# Patient Record
Sex: FEMALE | Race: WHITE | Employment: FULL TIME | URBAN - NONMETROPOLITAN AREA
[De-identification: names, ages, dates, MRNs, and addresses within clinical notes are randomized per-mention and may not be internally consistent; named-entity substitution may affect disease eponyms.]

---

## 2017-05-08 ENCOUNTER — OFFICE VISIT (OUTPATIENT)
Dept: FAMILY MEDICINE CLINIC | Age: 22
End: 2017-05-08

## 2017-05-08 VITALS
BODY MASS INDEX: 22.76 KG/M2 | WEIGHT: 141.6 LBS | HEART RATE: 72 BPM | HEIGHT: 66 IN | DIASTOLIC BLOOD PRESSURE: 60 MMHG | SYSTOLIC BLOOD PRESSURE: 134 MMHG | TEMPERATURE: 98.6 F

## 2017-05-08 DIAGNOSIS — J01.00 SUBACUTE MAXILLARY SINUSITIS: Primary | ICD-10-CM

## 2017-05-08 PROCEDURE — 99211 OFF/OP EST MAY X REQ PHY/QHP: CPT | Performed by: FAMILY MEDICINE

## 2017-05-08 PROCEDURE — G8420 CALC BMI NORM PARAMETERS: HCPCS | Performed by: FAMILY MEDICINE

## 2017-05-08 PROCEDURE — G8427 DOCREV CUR MEDS BY ELIG CLIN: HCPCS | Performed by: FAMILY MEDICINE

## 2017-05-08 RX ORDER — DOXYCYCLINE HYCLATE 100 MG/1
100 CAPSULE ORAL 2 TIMES DAILY
Qty: 20 CAPSULE | Refills: 0 | Status: SHIPPED | OUTPATIENT
Start: 2017-05-08 | End: 2017-05-18

## 2017-11-03 ENCOUNTER — NURSE ONLY (OUTPATIENT)
Dept: FAMILY MEDICINE CLINIC | Age: 22
End: 2017-11-03
Payer: COMMERCIAL

## 2017-11-03 DIAGNOSIS — Z23 NEED FOR IMMUNIZATION AGAINST INFLUENZA: Primary | ICD-10-CM

## 2017-11-03 PROCEDURE — 90471 IMMUNIZATION ADMIN: CPT | Performed by: FAMILY MEDICINE

## 2017-11-03 PROCEDURE — 90688 IIV4 VACCINE SPLT 0.5 ML IM: CPT | Performed by: FAMILY MEDICINE

## 2018-08-07 ENCOUNTER — NURSE ONLY (OUTPATIENT)
Dept: FAMILY MEDICINE CLINIC | Age: 23
End: 2018-08-07
Payer: COMMERCIAL

## 2018-08-07 DIAGNOSIS — Z11.1 SCREENING FOR TUBERCULOSIS: Primary | ICD-10-CM

## 2018-08-07 PROCEDURE — 86580 TB INTRADERMAL TEST: CPT | Performed by: FAMILY MEDICINE

## 2018-08-07 NOTE — PROGRESS NOTES
Patient presented to office for Mantoux Test. Given in Right Forearm. Patient tolerated procedure well. Advised to come back into office on Thursday to be read. Verbalized understanding.

## 2018-08-10 LAB
INDURATION: 0
TB SKIN TEST: NEGATIVE

## 2019-02-21 ENCOUNTER — E-VISIT (OUTPATIENT)
Dept: FAMILY MEDICINE CLINIC | Age: 24
End: 2019-02-21
Payer: COMMERCIAL

## 2019-02-21 DIAGNOSIS — J01.90 ACUTE SINUSITIS, RECURRENCE NOT SPECIFIED, UNSPECIFIED LOCATION: Primary | ICD-10-CM

## 2019-02-21 PROCEDURE — 99444 PR PHYSICIAN ONLINE EVALUATION & MANAGEMENT SERVICE: CPT | Performed by: FAMILY MEDICINE

## 2019-02-21 RX ORDER — PREDNISONE 10 MG/1
10 TABLET ORAL 2 TIMES DAILY
Qty: 14 TABLET | Refills: 0 | Status: SHIPPED | OUTPATIENT
Start: 2019-02-21 | End: 2019-02-28

## 2019-02-21 RX ORDER — AMOXICILLIN 500 MG/1
500 CAPSULE ORAL 2 TIMES DAILY
Qty: 20 CAPSULE | Refills: 0 | Status: SHIPPED | OUTPATIENT
Start: 2019-02-21 | End: 2019-04-14 | Stop reason: SDUPTHER

## 2019-02-21 ASSESSMENT — LIFESTYLE VARIABLES: SMOKING_STATUS: NO, I'VE NEVER SMOKED

## 2019-04-14 ENCOUNTER — TELEPHONE (OUTPATIENT)
Dept: FAMILY MEDICINE CLINIC | Age: 24
End: 2019-04-14

## 2019-04-14 DIAGNOSIS — J02.0 STREP THROAT: Primary | ICD-10-CM

## 2019-04-14 RX ORDER — AMOXICILLIN 500 MG/1
500 CAPSULE ORAL 2 TIMES DAILY
Qty: 20 CAPSULE | Refills: 0 | Status: SHIPPED | OUTPATIENT
Start: 2019-04-14 | End: 2019-04-24

## 2019-04-14 NOTE — TELEPHONE ENCOUNTER
Patient's mother phoned in stating that Irlanda Kilgore is come from school and has tonsillar exudates, fever to 100.5, and no cough. Rx for Amoxicillin was sent in.

## 2019-04-18 ENCOUNTER — OFFICE VISIT (OUTPATIENT)
Dept: FAMILY MEDICINE CLINIC | Age: 24
End: 2019-04-18
Payer: COMMERCIAL

## 2019-04-18 VITALS
HEART RATE: 80 BPM | WEIGHT: 148.8 LBS | TEMPERATURE: 98.5 F | SYSTOLIC BLOOD PRESSURE: 118 MMHG | BODY MASS INDEX: 23.91 KG/M2 | HEIGHT: 66 IN | DIASTOLIC BLOOD PRESSURE: 62 MMHG

## 2019-04-18 DIAGNOSIS — J02.9 ACUTE VIRAL PHARYNGITIS: Primary | ICD-10-CM

## 2019-04-18 PROCEDURE — 99213 OFFICE O/P EST LOW 20 MIN: CPT | Performed by: FAMILY MEDICINE

## 2019-04-18 ASSESSMENT — PATIENT HEALTH QUESTIONNAIRE - PHQ9
1. LITTLE INTEREST OR PLEASURE IN DOING THINGS: 0
SUM OF ALL RESPONSES TO PHQ QUESTIONS 1-9: 0
SUM OF ALL RESPONSES TO PHQ QUESTIONS 1-9: 0
2. FEELING DOWN, DEPRESSED OR HOPELESS: 0
SUM OF ALL RESPONSES TO PHQ9 QUESTIONS 1 & 2: 0

## 2019-04-19 NOTE — PROGRESS NOTES
Name:  Danielle Stephens  :    1995    Chief Complaint   Patient presents with    Pharyngitis       HPI:  Neeru Renee has a history of large tonsils as a child. No history of OM or MAT. She does not snore. She reports 3---monthly ---episodes of severe sore throat with fever and large tonsils and nodes. Twice she got treated. Physical Exam:      /62 (Site: Left Upper Arm, Position: Sitting, Cuff Size: Medium Adult)   Pulse 80   Temp 98.5 °F (36.9 °C) (Oral)   Ht 5' 6\" (1.676 m)   Wt 148 lb 12.8 oz (67.5 kg)   LMP 2019 (Exact Date)   Breastfeeding? No   BMI 24.02 kg/m²     ENT:   TM's clear. Phar is red with normal tonsils. No nodes. Lungs are clear. Heart in RRR with no murmur. Assessment/Plan:      Recurrent pharyngitis and tonsillitis. Treat as necessary and consider ENT      There are no diagnoses linked to this encounter.

## 2020-07-07 ENCOUNTER — OFFICE VISIT (OUTPATIENT)
Dept: FAMILY MEDICINE CLINIC | Age: 25
End: 2020-07-07
Payer: COMMERCIAL

## 2020-07-07 VITALS
WEIGHT: 158 LBS | HEART RATE: 74 BPM | SYSTOLIC BLOOD PRESSURE: 122 MMHG | BODY MASS INDEX: 25.39 KG/M2 | DIASTOLIC BLOOD PRESSURE: 78 MMHG | OXYGEN SATURATION: 98 % | HEIGHT: 66 IN | TEMPERATURE: 98.4 F

## 2020-07-07 PROBLEM — N92.6 MENSTRUAL IRREGULARITY: Status: ACTIVE | Noted: 2020-07-07

## 2020-07-07 PROCEDURE — 99204 OFFICE O/P NEW MOD 45 MIN: CPT | Performed by: FAMILY MEDICINE

## 2020-07-07 SDOH — HEALTH STABILITY: MENTAL HEALTH: HOW MANY STANDARD DRINKS CONTAINING ALCOHOL DO YOU HAVE ON A TYPICAL DAY?: 1 OR 2

## 2020-07-07 SDOH — HEALTH STABILITY: MENTAL HEALTH: HOW OFTEN DO YOU HAVE A DRINK CONTAINING ALCOHOL?: 2-4 TIMES A MONTH

## 2020-07-07 ASSESSMENT — PATIENT HEALTH QUESTIONNAIRE - PHQ9
SUM OF ALL RESPONSES TO PHQ QUESTIONS 1-9: 0
SUM OF ALL RESPONSES TO PHQ QUESTIONS 1-9: 0
2. FEELING DOWN, DEPRESSED OR HOPELESS: 0
SUM OF ALL RESPONSES TO PHQ9 QUESTIONS 1 & 2: 0
1. LITTLE INTEREST OR PLEASURE IN DOING THINGS: 0

## 2020-07-07 ASSESSMENT — ENCOUNTER SYMPTOMS
RESPIRATORY NEGATIVE: 1
EYES NEGATIVE: 1
GASTROINTESTINAL NEGATIVE: 1
ALLERGIC/IMMUNOLOGIC NEGATIVE: 1

## 2020-07-07 NOTE — PROGRESS NOTES
Subjective:      Patient ID: Pedrito Rebollar is a 21 y.o. female. Blood pressure 122/78, pulse 74, temperature 98.4 °F (36.9 °C), temperature source Temporal, height 5' 6\" (1.676 m), weight 158 lb (71.7 kg), last menstrual period 07/06/2020, SpO2 98 %, not currently breastfeeding. HPI   Chief Complaint   Patient presents with    New Patient     get established for [facility]/fertility only (has PCP)      Here for concerns of infertility. Marred to María Chi (age 21, no pmh, no meds, no prior SFA) 8/19. Seeking pregnancy since 1/20. 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 Says after her 4/20 cycle, her teacher recommended eval for low progesterone based on chart abnormalities. Regular cycles, ranging 26-36 days. Luteal phase 10-17 days. Occasional TEBB  Decent mucus (average score 6)    Symptoms: improving superficial dyspareunia (at introitus), no deep dyspareunia. She is a runner and notes pelvic cramping after long runs in the luteal phase. Resolves after 10-20 min. Menses are uncomfortable to take Ibuprofen on the first day of menses. Improving with age.  + PMS for 3-4 days (fatigue, cramping, headaches, irritable, soto, bloating, occ breast tenderness, food cravings). She was seeing extraneous mucus and took lactobacillus supplement. She has never been pregnant.  has never fathered children before. Pap normal 1/20 by local OB.    + acne that has gradually worsened. + hair on upper lip, chin. Plucks hair from chin/chest.  Cycle length has increased in past 3-4 months, up to 36 days, ovulating as late as cycle day 27. Review of Systems   Constitutional: Negative. HENT: Negative. Eyes: Negative. Respiratory: Negative. Cardiovascular: Negative. Gastrointestinal: Negative. Endocrine: Negative. Genitourinary: Negative. Musculoskeletal: Negative. Skin: Negative.         + acne on face chest and back.   Has worsened gradually in recent years. Allergic/Immunologic: Negative. Neurological: Negative. Hematological: Negative. Psychiatric/Behavioral: Negative. Objective:   Physical Exam  Vitals signs and nursing note reviewed. Constitutional:       General: She is not in acute distress. Appearance: Normal appearance. She is well-developed and normal weight. She is not diaphoretic. HENT:      Head: Normocephalic and atraumatic. Right Ear: Tympanic membrane, ear canal and external ear normal.      Left Ear: Tympanic membrane, ear canal and external ear normal.      Nose: Nose normal. No congestion or rhinorrhea. Mouth/Throat:      Mouth: Mucous membranes are moist.      Pharynx: Oropharynx is clear. No oropharyngeal exudate. Eyes:      General: No scleral icterus. Right eye: No discharge. Left eye: No discharge. Conjunctiva/sclera: Conjunctivae normal.      Pupils: Pupils are equal, round, and reactive to light. Neck:      Musculoskeletal: Normal range of motion and neck supple. Thyroid: No thyromegaly. Vascular: No JVD. Trachea: No tracheal deviation. Comments: No thyromegaly  Cardiovascular:      Rate and Rhythm: Normal rate and regular rhythm. Pulses: Normal pulses. Heart sounds: Normal heart sounds. No murmur. No friction rub. Pulmonary:      Effort: Pulmonary effort is normal. No respiratory distress. Breath sounds: Normal breath sounds. No stridor. No wheezing, rhonchi or rales. Abdominal:      General: Bowel sounds are normal. There is no distension. Palpations: Abdomen is soft. There is no mass. Tenderness: There is no abdominal tenderness. There is no guarding or rebound. Hernia: No hernia is present. Musculoskeletal: Normal range of motion. General: No swelling, tenderness or deformity. Right lower leg: No edema. Left lower leg: No edema. Lymphadenopathy:      Cervical: No cervical adenopathy.    Skin: General: Skin is warm and dry. Findings: Rash (+ mild facial inflammatory comedos, also chest/back. mild hirsute upper lip.) present. Neurological:      General: No focal deficit present. Mental Status: She is alert and oriented to person, place, and time. Mental status is at baseline. Deep Tendon Reflexes: Reflexes are normal and symmetric. Psychiatric:         Mood and Affect: Mood normal.         Behavior: Behavior normal.         Thought Content: Thought content normal.         Judgment: Judgment normal.         Assessment:      1. Menstrual irregularity    discussed a couple abnormalities of her menstrual pattern. 1. Unstable (high variability) luteal phase with PMS and tail end brown spotting often indicated luteal dysfunction. discussed rationale for investigating this with a luteal estradiol and progesterone profile. She will do this local to her this cycle (she is cycle day 2 today) and we will discuss the results virtually after. Also advised thyroid and prolactin testing.  - Estradiol; Standing  - Progesterone; Standing  - TSH without Reflex; Future  - Prolactin; Future    2. Almost meets criteria for PCOS: hirsutism/acne with lengthening cycles as she has gained weight.  - discussed pathophysiology of PCOS, 2003 [place] criteria, the role of hyperinsulinemia as well as treatments to reduce insulin resistance to help normalize cycle length and perhaps improve ovulatory function. She wishes to defer medical therapy at this time to pursue lifestyle changes. Continue to monitor. No indication for infertility eval/workup at this time. Plan:        I spent a total of 45 minutes face to face with the patient and greater than 50% of the time was spent in discussing disease pathophysiology, motivational counseling, answering questions, addressing concerns, reviewing labs and/or other studies with the patient as well as coordinating care.             Scott Avelar, MD

## 2020-08-12 ENCOUNTER — TELEMEDICINE (OUTPATIENT)
Dept: FAMILY MEDICINE CLINIC | Age: 25
End: 2020-08-12
Payer: COMMERCIAL

## 2020-08-12 PROBLEM — N91.4 SECONDARY OLIGOMENORRHEA: Status: ACTIVE | Noted: 2020-08-12

## 2020-08-12 PROBLEM — E28.8 OTHER OVARIAN DYSFUNCTION: Status: ACTIVE | Noted: 2020-08-12

## 2020-08-12 PROCEDURE — 99215 OFFICE O/P EST HI 40 MIN: CPT | Performed by: FAMILY MEDICINE

## 2020-08-12 RX ORDER — ESTRADIOL 1 MG/1
1 TABLET ORAL DAILY
Qty: 30 TABLET | Refills: 1 | Status: SHIPPED | OUTPATIENT
Start: 2020-08-12 | End: 2021-02-09 | Stop reason: SDUPTHER

## 2020-11-05 ENCOUNTER — TELEPHONE (OUTPATIENT)
Dept: FAMILY MEDICINE CLINIC | Age: 25
End: 2020-11-05

## 2020-11-05 NOTE — TELEPHONE ENCOUNTER
lvm for pt  She may opt to use GoodRx card  would be $19 at [employer] to check with her before sending to mail order

## 2020-11-06 NOTE — TELEPHONE ENCOUNTER
Pt is going to try the goodrx with [facility]  Please send to [facility] [address] in [place]    Please advise

## 2020-12-01 ENCOUNTER — OFFICE VISIT (OUTPATIENT)
Dept: FAMILY MEDICINE CLINIC | Age: 25
End: 2020-12-01
Payer: COMMERCIAL

## 2020-12-01 VITALS
BODY MASS INDEX: 23.14 KG/M2 | SYSTOLIC BLOOD PRESSURE: 120 MMHG | TEMPERATURE: 97.7 F | OXYGEN SATURATION: 98 % | DIASTOLIC BLOOD PRESSURE: 76 MMHG | WEIGHT: 144 LBS | HEIGHT: 66 IN | HEART RATE: 82 BPM

## 2020-12-01 PROCEDURE — 99214 OFFICE O/P EST MOD 30 MIN: CPT | Performed by: FAMILY MEDICINE

## 2020-12-01 RX ORDER — METFORMIN HYDROCHLORIDE 500 MG/1
1500 TABLET, EXTENDED RELEASE ORAL
Qty: 90 TABLET | Refills: 5 | Status: SHIPPED | OUTPATIENT
Start: 2020-12-01 | End: 2021-08-10

## 2020-12-01 RX ORDER — DOXYCYCLINE HYCLATE 100 MG
100 TABLET ORAL 2 TIMES DAILY
Qty: 28 TABLET | Refills: 0 | Status: SHIPPED | OUTPATIENT
Start: 2020-12-01 | End: 2020-12-15

## 2020-12-01 NOTE — PROGRESS NOTES
Subjective:      Patient ID: Deion Aguayo is a 31 y.o. female. Blood pressure 120/76, pulse 82, temperature 97.7 °F (36.5 °C), temperature source Temporal, height 5' 6\" (1.676 m), weight 144 lb (65.3 kg), last menstrual period 11/06/2020, SpO2 98 %, not currently breastfeeding. HPI    Chief Complaint   Patient presents with    Follow-up     fertility/napro f/u      F/u on menstrual irregularity, variable luteal phase 10-17 days, PMS, pelvic pain/cramping (nicki after running, dysmenorrhea), with delayed conception (fertility focused intercourse since 1/20). G0.    Hx long cycles up to 36 days and acne. Not on insulin sensitizing agents yet. Is using clomid cycle day 3.4.5 since Sept (3 cycles.)   Completed luteal profile in July- showed ovarian dysfunction. Current luteal treatment: luteal estrace 1mg and Prometrium 200mg peak plus 3-12     Her recent peak plus 7 levels were ideal: progesterone 20.0 and E2 246. Chart review: cycle length 29-32 days in past 3 cycles, good mucus (5 days of peak type mucus) even on clomid. 1 day of TEBB. Has 'wet' discharge that is pretty constant. Notes that this is sometimes associated with chronic cervicitis. Mild dyspareunia, but is superficial and is improving. Mild cramping with menses  PMS: mild cramping 3 days prior to menses, more emotional. This has not really changed since starting the luteal estrace/progesterone treatments. No prior HSG  No prior SFA done. No prior TVUS    She has lost weight: she has reduced sugars and high glycemic foods. Starting 7/20. Prior to July, her cycles were 36 days or more often. She was also more stressed then. Patient Active Problem List   Diagnosis    Menstrual irregularity    Other ovarian dysfunction (luteal defect, 7/2020)    Secondary oligomenorrhea (sporadic long cycles with acne/hirsutism, likely PCOS). Body mass index is 23.24 kg/m².     Wt Readings from Last 3 Encounters:   12/01/20 144 lb (65.3 kg)   20 158 lb (71.7 kg)   19 148 lb 12.8 oz (67.5 kg)      BP Readings from Last 3 Encounters:   20 120/76   20 122/78   19 118/62      Current Outpatient Medications   Medication Sig Dispense Refill    clomiPHENE (CLOMID) 50 MG tablet Take 2 tablets by mouth daily for 3 days (days 3,4 and 5 each cycle) 18 tablet 1    progesterone (PROMETRIUM) 200 MG capsule 1 cap intravaginally each night as directed (peak plus 3-12) 30 capsule 1    estradiol (ESTRACE) 1 MG tablet Take 1 tablet by mouth daily (peak plus 3-12) 30 tablet 1    Prenatal Multivit-Min-Fe-FA (PRE-TA FORMULA) TABS Take by mouth daily       No current facility-administered medications for this visit. Immunization History   Administered Date(s) Administered    DTaP 01/10/1995, 1995, 1995, 1996, 1999    Hepatitis B (Engerix-B) 1999, 1999, 1999    Hib PRP-OMP (PedvaxHIB) 01/10/1995, 1995, 1995, 1996    Influenza, Norva Erasmo, IM, (6 mo and older Fluzone, Flulaval, Fluarix and 3 yrs and older Afluria) 2017    MMR 1996, 1999    PPD Test 2018    Polio IPV (IPOL) 01/10/1995, 1995, 1995, 1999    Tdap (Boostrix, Adacel) 2008        Social History     Tobacco Use    Smoking status: Never Smoker    Smokeless tobacco: Never Used   Substance Use Topics    Alcohol use: Yes     Alcohol/week: 1.0 standard drinks     Types: 1 Glasses of wine per week     Frequency: 2-4 times a month     Drinks per session: 1 or 2     Binge frequency: Less than monthly     Comment: ocassional make have liquor    Drug use: Never        Review of Systems no new concerns. Objective:   Physical Exam  Vitals signs and nursing note reviewed. Constitutional:       General: She is not in acute distress. Appearance: Normal appearance. She is not ill-appearing.    Pulmonary:      Effort: Pulmonary effort is normal.   Skin:     General: Skin is warm. Neurological:      General: No focal deficit present. Mental Status: She is alert and oriented to person, place, and time. Mental status is at baseline. Psychiatric:         Mood and Affect: Mood normal.         Behavior: Behavior normal.         Thought Content: Thought content normal.         Judgment: Judgment normal.         Assessment:      1. Other ovarian dysfunction (luteal defect, 7/2020)  - continue luteal support with progesterone and estradiol supplementation and continue to monitor response. - continue ovarian stim in hopes of pregnancy. - se discussed that her mucus currently is adequate for conception and is 'tolerating' the antiestrogen effect of clomid. 2. Secondary oligomenorrhea (sporadic long cycles with acne/hirsutism, likely PCOS). - assess for other evidence of PCO with TVUS. - US NON OB TRANSVAGINAL; Future  - start insulin sensitizing agent metformin with her approval, off label for likely pcos. - continue healthy lifestyle changes as well- likely is helping. 3. Vaginal discharge  - chronic wet d/c. In lieu of testing for mycoplasma/ureaplasma, will treat presumptively with doxy bid for 2 wks. She will report her response. Plan:      F/u 3 mo, virtually. I spent a total of 25 minutes face to face with the patient and greater than 50% of the time was spent in discussing disease pathophysiology, motivational counseling, answering questions, addressing concerns, reviewing labs and/or other studies with the patient as well as coordinating care.             Breanna Zamora MD

## 2020-12-21 ENCOUNTER — HOSPITAL ENCOUNTER (OUTPATIENT)
Dept: ULTRASOUND IMAGING | Age: 25
Discharge: HOME | End: 2020-12-21
Payer: COMMERCIAL

## 2020-12-21 PROCEDURE — 93975 VASCULAR STUDY: CPT

## 2020-12-21 PROCEDURE — 76830 TRANSVAGINAL US NON-OB: CPT

## 2021-02-09 RX ORDER — ESTRADIOL 1 MG/1
1 TABLET ORAL DAILY
Qty: 30 TABLET | Refills: 1 | Status: SHIPPED | OUTPATIENT
Start: 2021-02-09 | End: 2021-04-12

## 2021-02-15 ENCOUNTER — HOSPITAL ENCOUNTER (OUTPATIENT)
Dept: ULTRASOUND IMAGING | Age: 26
Discharge: HOME | End: 2021-02-15
Payer: COMMERCIAL

## 2021-02-15 DIAGNOSIS — N83.292 COMPLEX CYST OF LEFT OVARY: ICD-10-CM

## 2021-02-15 PROCEDURE — 93975 VASCULAR STUDY: CPT

## 2021-02-15 PROCEDURE — 76830 TRANSVAGINAL US NON-OB: CPT

## 2021-03-03 ENCOUNTER — TELEMEDICINE (OUTPATIENT)
Dept: FAMILY MEDICINE CLINIC | Age: 26
End: 2021-03-03
Payer: COMMERCIAL

## 2021-03-03 DIAGNOSIS — N88.3 FEMALE INFERTILITY ASSOCIATED WITH ANOMALY OF CERVICAL MUCUS: ICD-10-CM

## 2021-03-03 DIAGNOSIS — E28.8 OTHER OVARIAN DYSFUNCTION: Primary | ICD-10-CM

## 2021-03-03 DIAGNOSIS — N91.4 SECONDARY OLIGOMENORRHEA: ICD-10-CM

## 2021-03-03 PROCEDURE — 99214 OFFICE O/P EST MOD 30 MIN: CPT | Performed by: FAMILY MEDICINE

## 2021-03-03 ASSESSMENT — PATIENT HEALTH QUESTIONNAIRE - PHQ9
SUM OF ALL RESPONSES TO PHQ QUESTIONS 1-9: 0
1. LITTLE INTEREST OR PLEASURE IN DOING THINGS: 0

## 2021-03-03 NOTE — PROGRESS NOTES
Taking? Authorizing Provider   estradiol (ESTRACE) 1 MG tablet Take 1 tablet by mouth daily (peak plus 3-12) Yes Sachin Case MD   progesterone (PROMETRIUM) 200 MG capsule 1 cap intravaginally each night as directed (peak plus 3-12) Yes Sachin Case MD   metFORMIN (GLUCOPHAGE-XR) 500 MG extended release tablet Take 3 tablets by mouth daily (with breakfast) Yes Sachin Case MD   Prenatal Multivit-Min-Fe-FA (PRE- FORMULA) TABS Take by mouth daily Yes Historical Provider, MD   clomiPHENE (CLOMID) 50 MG tablet Take 2 tablets by mouth daily for 3 days (days 3,4 and 5 each cycle)  Sachin Case MD       Social History     Tobacco Use    Smoking status: Never Smoker    Smokeless tobacco: Never Used   Substance Use Topics    Alcohol use: Yes     Alcohol/week: 1.0 standard drinks     Types: 1 Glasses of wine per week     Frequency: 2-4 times a month     Drinks per session: 1 or 2     Binge frequency: Less than monthly     Comment: ocassional make have liquor    Drug use: Never            PHYSICAL EXAMINATION:  Physical Exam  Constitutional:       General: She is not in acute distress. Appearance: Normal appearance. She is not ill-appearing. Pulmonary:      Effort: Pulmonary effort is normal.   Skin:     General: Skin is warm. Neurological:      General: No focal deficit present. Mental Status: She is alert and oriented to person, place, and time. Mental status is at baseline. Psychiatric:         Mood and Affect: Mood normal.         Behavior: Behavior normal.         Thought Content: Thought content normal.         Judgment: Judgment normal.          ASSESSMENT/PLAN:    1. Other ovarian dysfunction (luteal defect, 2020)  - incr clomid to 150 days 3.4.5. Monitor mucus. If mucus declines, could transition to Femara for ovarian stim.     - continue luteal progesterone and estradiol supplementation and peak plus 7 monitoring.  - continue charting andfertiltiy focused intercourse. 2. Secondary oligomenorrhea (sporadic long cycles with acne/hirsutism, likely PCOS). - unsure if metformin helping yet. She just got to 1500 mg (has had nausea to slow titration). Continue to montior. F/u in office to review charts in 3 mo. 3. 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 Mail order for SFA for   She will request HSG from her GYN    Return in about 3 months (around 6/3/2021) for follow up fertility, 'in office' visit. Celia Sanchez is a 31 y.o. female being evaluated by a Virtual Visit (video visit) encounter to address concerns as mentioned above. A caregiver was present when appropriate. Due to this being a TeleHealth encounter (During [id number] public health emergency), evaluation of the following organ systems was limited: Vitals/Constitutional/EENT/Resp/CV/GI//MS/Neuro/Skin/Heme-Lymph-Imm. Pursuant to the emergency declaration under the [address] [place] [address] authority and the [employer] and [employer] Act, this Virtual Visit was conducted with patient's (and/or legal guardian's) consent, to reduce the patient's risk of exposure to COVID-19 and provide necessary medical care. The patient (and/or legal guardian) has also been advised to contact this office for worsening conditions or problems, and seek emergency medical treatment and/or call 911 if deemed necessary. Patient identification was verified at the start of the visit: Yes    Total time spent on this encounter: 21 or more minutes were spent on the digital evaluation and management of this patient. Services were provided through a video synchronous discussion virtually to substitute for in-person clinic visit. Patient and provider were located at their individual homes.     --Mena Strange MD on 3/3/2021 at 7:59 AM    An electronic signature was used to authenticate this note.

## 2021-03-04 ENCOUNTER — TELEPHONE (OUTPATIENT)
Dept: FAMILY MEDICINE CLINIC | Age: 26
End: 2021-03-04

## 2021-03-04 DIAGNOSIS — N88.3 FEMALE INFERTILITY ASSOCIATED WITH ANOMALY OF CERVICAL MUCUS: Primary | ICD-10-CM

## 2021-03-04 NOTE — TELEPHONE ENCOUNTER
Patient calling stating seen Dr. Nini Sheikh virtually yesterday and he requested patient call her obgyn to get an hsg test ordered. Patient obgyn stated there has to be an order in system for patient to get this done.    Please give patient a call when order is placed she will call them to schedule   Please advise

## 2021-03-04 NOTE — TELEPHONE ENCOUNTER
Informed her that Dr. Carlos Lu is gone for the day. Will have him address this message in the morning and she will get a call back tomorrow.

## 2021-03-05 NOTE — TELEPHONE ENCOUNTER
Did you already order this or does it need to be ordered?   Once ordered let me know I will call their office and get the fax number and fax it over

## 2021-03-05 NOTE — TELEPHONE ENCOUNTER
Please call her OB/GYN. Loils Ramirez has primary infertility and needs an HSG. Does this doctor do this test and how can we help facilitate getting it done? Patient Active Problem List   Diagnosis    Menstrual irregularity    Other ovarian dysfunction (luteal defect, 7/2020)    Secondary oligomenorrhea (sporadic long cycles with acne/hirsutism, likely PCOS).    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

## 2021-03-05 NOTE — TELEPHONE ENCOUNTER
See if you can get the name and office number for her OB/GYN so we can work it out with their office. Thanks.

## 2021-03-22 ENCOUNTER — TELEPHONE (OUTPATIENT)
Dept: FAMILY MEDICINE CLINIC | Age: 26
End: 2021-03-22

## 2021-03-22 DIAGNOSIS — N88.3 FEMALE INFERTILITY ASSOCIATED WITH ANOMALY OF CERVICAL MUCUS: Primary | ICD-10-CM

## 2021-03-24 ENCOUNTER — TELEPHONE (OUTPATIENT)
Dept: FAMILY MEDICINE CLINIC | Age: 26
End: 2021-03-24

## 2021-03-24 NOTE — TELEPHONE ENCOUNTER
Please notify Haile Reyes that I am unfortunately not aware of other options in [place] for the saline hysterosonogram.  I think having it done in May would be OK.

## 2021-03-24 NOTE — TELEPHONE ENCOUNTER
Patient calling asking some questions on a sonogram she wants ordered. Patient would like to know if there is somewhere else she can get this sonogram done at. The fertility clinic cannot have this done until may or June   Patient does not want to wait that long. Patient would like to know if Dr. Adali Luis knows anywhere she can go to get this done at? Fax number to [address]    [phone] please fax order if there is no other place she can go .      Please advise

## 2021-03-26 ENCOUNTER — PATIENT MESSAGE (OUTPATIENT)
Dept: FAMILY MEDICINE CLINIC | Age: 26
End: 2021-03-26

## 2021-03-29 NOTE — TELEPHONE ENCOUNTER
From: Harley Ruiz  Sent: 3/28/2021 7:53 AM EDT  To: [facility]  Subject: RE: peak plus 7    Hi Dr. Ana Santillan, my mucus is pretty low this cycle with the higher dose of clomid. I started Vitamin B6 on the first day of the previous cycle (2/3/21). I've attached a picture of my chart from this cycle. Thanks, [facility]  ----- Message -----  From: Pearl Bean MD  Sent: 3/26/21 1:56 PM  To: Harley Ruiz  Subject: peak plus 7    Hi Kylee. Your peak plus 7 progesterone was 26 and estradiol was 213. Both are excellent! Continue the Clomid and luteal progesterone and estradiol supplementation as before. Let me know if your mucus is declining on the higher dose of clomid. Take care!

## 2021-03-30 RX ORDER — AMOXICILLIN 500 MG/1
TABLET, FILM COATED ORAL
Qty: 40 TABLET | Refills: 1 | Status: SHIPPED | OUTPATIENT
Start: 2021-03-30 | End: 2021-12-22 | Stop reason: SINTOL

## 2021-04-12 RX ORDER — ESTRADIOL 1 MG/1
TABLET ORAL
Qty: 30 TABLET | Refills: 1 | Status: SHIPPED | OUTPATIENT
Start: 2021-04-12 | End: 2021-12-17

## 2021-06-03 DIAGNOSIS — E28.8 OTHER OVARIAN DYSFUNCTION: ICD-10-CM

## 2021-06-29 ENCOUNTER — TELEPHONE (OUTPATIENT)
Dept: FAMILY MEDICINE CLINIC | Age: 26
End: 2021-06-29

## 2021-06-29 ENCOUNTER — OFFICE VISIT (OUTPATIENT)
Dept: FAMILY MEDICINE CLINIC | Age: 26
End: 2021-06-29
Payer: COMMERCIAL

## 2021-06-29 VITALS
BODY MASS INDEX: 23.46 KG/M2 | HEIGHT: 66 IN | WEIGHT: 146 LBS | HEART RATE: 74 BPM | SYSTOLIC BLOOD PRESSURE: 122 MMHG | OXYGEN SATURATION: 99 % | DIASTOLIC BLOOD PRESSURE: 80 MMHG

## 2021-06-29 DIAGNOSIS — N91.4 SECONDARY OLIGOMENORRHEA: ICD-10-CM

## 2021-06-29 DIAGNOSIS — N94.3 PMS (PREMENSTRUAL SYNDROME): ICD-10-CM

## 2021-06-29 DIAGNOSIS — E28.8 OTHER OVARIAN DYSFUNCTION: Primary | ICD-10-CM

## 2021-06-29 DIAGNOSIS — N88.3 FEMALE INFERTILITY ASSOCIATED WITH ANOMALY OF CERVICAL MUCUS: ICD-10-CM

## 2021-06-29 PROBLEM — N92.6 MENSTRUAL IRREGULARITY: Status: RESOLVED | Noted: 2020-07-07 | Resolved: 2021-06-29

## 2021-06-29 PROCEDURE — 99215 OFFICE O/P EST HI 40 MIN: CPT | Performed by: FAMILY MEDICINE

## 2021-06-29 RX ORDER — LETROZOLE 2.5 MG/1
TABLET, FILM COATED ORAL
Qty: 8 TABLET | Refills: 5 | Status: SHIPPED | OUTPATIENT
Start: 2021-06-29 | End: 2021-06-29 | Stop reason: SDUPTHER

## 2021-06-29 RX ORDER — ERGOCALCIFEROL (VITAMIN D2) 1250 MCG
CAPSULE ORAL
COMMUNITY
Start: 2021-06-18 | End: 2021-12-22

## 2021-06-29 RX ORDER — NALTREXONE HYDROCHLORIDE 50 MG/1
TABLET, FILM COATED ORAL
Qty: 15 TABLET | Refills: 5 | Status: SHIPPED | OUTPATIENT
Start: 2021-06-29 | End: 2022-04-21

## 2021-06-29 RX ORDER — LETROZOLE 2.5 MG/1
TABLET, FILM COATED ORAL
Qty: 8 TABLET | Refills: 5 | Status: SHIPPED | OUTPATIENT
Start: 2021-06-29 | End: 2021-12-22 | Stop reason: SDUPTHER

## 2021-06-29 NOTE — TELEPHONE ENCOUNTER
letrozole [facility]) 2.5 MG tablet         Pharmacy called in wants to see if the prescription can say one dose per day so that this will be covered

## 2021-06-29 NOTE — PATIENT INSTRUCTIONS
Christofer Barrera MD  Address: [facility] [place], [address]  Phone:([phone]

## 2021-07-01 ENCOUNTER — TELEPHONE (OUTPATIENT)
Dept: FAMILY MEDICINE CLINIC | Age: 26
End: 2021-07-01

## 2021-07-01 NOTE — TELEPHONE ENCOUNTER
Has appt at Dr Glory Mckeon (partner of Dr Solomon Acosta). Needs office notes, tests, labs (pertaining to fertility) faxed for appt 8/10/21  Thanks.

## 2021-07-01 NOTE — PROGRESS NOTES
2021    Blood pressure 122/80, pulse 74, height 5' 6\" (1.676 m), weight 146 lb (66.2 kg), last menstrual period 2021, SpO2 99 %, not currently breastfeeding. Bruno Araujo (:  1995) is a 31 y.o. female, here for evaluation of the following medical concerns:    Chief Complaint   Patient presents with    Follow-up     fertility/napro     Here for fertility f/u.  f/u on menstrual irregularity, variable luteal phase 10-17 days, PMS, pelvic pain/cramping (nicki after running, dysmenorrhea), with delayed conception (fertility focused intercourse since ).  G0.  is Traci Mitchell (age 21, no meds or health problems). Seeking conception since 2020. Currently on clomid 150 for ovulation induction. Has not yet been on FEmara. Has been at 150 mg dose for 2 cycles    Currently peak plus 11. Did not test for pregnancy yet    [employer] [place]. From [place], so works with [employer]    We started metformin in December for acne and longish cycles (up to 36 days). This had not had much of an effect- but does note that her acne is improved. Cycles are currently 28-33 days, but adding clomid/luteal progesterone and estradiol seemed to have a greater effect. Moiz's SFA showed mild decrease in motility and morphology 21. He started proxeed then. Planning to repeat in late July. Saline Hysterogram done- normal earlier this month with her OB. Peak plus 7 this cycle was to goal: progesterone level 26.8 and estradiol 187. Levels have been to goal since starting luteal support about a year ago. Chart review:   Did not 'try' this cycle. Reduced clomid to 50 mg due to side effects and knowing that they would be traveling during the fertile window. Also did not use amoxil for mucus and it was much less (2 days peak type mucus this cycle, 5 - 6 days in previous cycles.)  2 - 3 days of brown bleeding is noted most cycles yet. They are exploring foster care and adoption.     Symptoms: Patient's Spouse Maryana (RASHMI on file) informed of provider's response/recommendations below and voiced understating and agrees with all. mild dyspareunia off and on.  + bloating, soto, and abd cramping in pelvic area, radiating to upper thighs premenstrually with mild dysmenorrhea, managed with Ibuprofen x 1-2 days. Low sex drive. No prior laparoscopy. Using vit B6 and amox for mucus. Patient Active Problem List   Diagnosis    Menstrual irregularity    Other ovarian dysfunction (luteal defect, 2020)    Secondary oligomenorrhea (sporadic long cycles with acne/hirsutism, likely PCOS). 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 Wt Readings from Last 3 Encounters:   21 146 lb (66.2 kg)   20 144 lb (65.3 kg)   20 158 lb (71.7 kg)       BP Readings from Last 3 Encounters:   21 122/80   20 120/76   20 122/78       No Known Allergies    Prior to Visit Medications    Medication Sig Taking?  Authorizing Provider   ergocalciferol (ERGOCALCIFEROL) 1.25 MG (32736 UT) capsule TAKE 1 CAPSULE BY MOUTH EVERY WEEK Yes Historical Provider, MD   estradiol (ESTRACE) 1 MG tablet TAKE 1 TABLET BY MOUTH DAILY(PEAK PLUS 3-12) Yes Thania Rm MD   progesterone (PROMETRIUM) 200 MG capsule INSERT 1 CAPSULE INTRAVAGINALLY EVERY NIGHT AS DIRECTED( PEAK PLUS 3-12) Yes Thania Rm MD   Amoxicillin 500 MG TABS 1 tablet twice daily from cycle day 9 through peak plus 2 Yes Thania Rm MD   metFORMIN (GLUCOPHAGE-XR) 500 MG extended release tablet Take 3 tablets by mouth daily (with breakfast) Yes Thania Rm MD   Prenatal Multivit-Min-Fe-FA (PRE-TA FORMULA) TABS Take by mouth daily Yes Historical Provider, MD   clomiPHENE (CLOMID) 50 MG tablet Take 3 tablets by mouth daily for 3 days (days 3,4 and 5 each cycle)  Thania Rm MD        Social History     Tobacco Use    Smoking status: Never Smoker    Smokeless tobacco: Never Used   Vaping Use    Vaping Use: Never used   Substance Use Topics    Alcohol use: Yes     Alcohol/week: 1.0 standard drinks     Types: 1 Glasses of wine per week     Comment: ocassional make have liquor    Drug use: Never       Review of Systems As above     Physical Exam  Constitutional:       General: She is not in acute distress. Appearance: Normal appearance. She is not ill-appearing. Pulmonary:      Effort: Pulmonary effort is normal.   Skin:     General: Skin is warm. Neurological:      General: No focal deficit present. Mental Status: She is alert and oriented to person, place, and time. Mental status is at baseline. Psychiatric:         Mood and Affect: Mood normal.         Behavior: Behavior normal.         Thought Content: Thought content normal.         Judgment: Judgment normal.         ASSESSMENT/PLAN:    1. Other ovarian dysfunction (luteal defect, 7/2020)  - change to femara for ovulation induction (20 mg, cycle day 2)  - continue luteal support with estrace and prometrium peak plus 3-12 and follow peak plus 7's. - Progesterone; Standing   - Estradiol; Standing  - encouraged further eval for unexplained infertility with Dr Willie Mcwilliams. 2. 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  - advised to continue daily B6 and amox 500 bid cycle day 9-peak plus 2    3. PMS (premenstrual syndrome)  - still present. Willing to try low dose naltrexone (start 1 mg and increase weekly to 5 mg as tolerated)    4. Secondary oligomenorrhea (sporadic long cycles with acne/hirsutism, likely PCOS). - on metformin now- acne is improved. She will continue this. Male factor: repeat SFA on Proxeed late July- order given. discussed and agree with pursuing adoption as well, if they have the energy/stamina to pursue both approaches simultaneously    Follow up: 3-4 months, virtual is fine. An  [employer] was used to authenticate this note.     --Huan Loo MD on 6/29/2021 at 9:22 AM

## 2021-07-01 NOTE — TELEPHONE ENCOUNTER
Pt calling stating she has some questions about new medications. Stated letrozole and naltrexone is what she has questions about. Stated she is seeing Dr. Scooby Stewart and needs records over records to that office. Advised pt other office can fax a request for records. Gave her our fax number.     Can be reached [phone] anytime

## 2021-07-01 NOTE — TELEPHONE ENCOUNTER
Copied ov notes, imaging and lab, and medical info needed. Faxed to [phone] for appt on 8/10/21. Informed [facility] that this was sent today.

## 2021-07-20 DIAGNOSIS — E28.8 OTHER OVARIAN DYSFUNCTION: ICD-10-CM

## 2021-08-10 DIAGNOSIS — E28.8 OTHER OVARIAN DYSFUNCTION: ICD-10-CM

## 2021-08-10 RX ORDER — METFORMIN HYDROCHLORIDE 500 MG/1
TABLET, EXTENDED RELEASE ORAL
Qty: 90 TABLET | Refills: 5 | Status: SHIPPED | OUTPATIENT
Start: 2021-08-10 | End: 2022-09-14

## 2021-08-31 DIAGNOSIS — E28.8 OTHER OVARIAN DYSFUNCTION: ICD-10-CM

## 2021-09-24 ENCOUNTER — VIRTUAL VISIT (OUTPATIENT)
Dept: FAMILY MEDICINE CLINIC | Age: 26
End: 2021-09-24
Payer: COMMERCIAL

## 2021-09-24 DIAGNOSIS — N91.4 SECONDARY OLIGOMENORRHEA: ICD-10-CM

## 2021-09-24 DIAGNOSIS — N94.6 DYSMENORRHEA: Primary | ICD-10-CM

## 2021-09-24 DIAGNOSIS — E28.8 OTHER OVARIAN DYSFUNCTION: ICD-10-CM

## 2021-09-24 DIAGNOSIS — N94.3 PMS (PREMENSTRUAL SYNDROME): ICD-10-CM

## 2021-09-24 DIAGNOSIS — N88.3 FEMALE INFERTILITY ASSOCIATED WITH ANOMALY OF CERVICAL MUCUS: ICD-10-CM

## 2021-09-24 PROCEDURE — 99214 OFFICE O/P EST MOD 30 MIN: CPT | Performed by: FAMILY MEDICINE

## 2021-09-24 NOTE — PROGRESS NOTES
2021    not currently breastfeeding. Ethan Casanova (:  1995) is a 31 y.o. female, here for evaluation of the following medical concerns:    Chief Complaint   Patient presents with    3 Month Follow-Up     fertility      Virtual video visit for f/u primary infertility. G0. She and moiz have been seeking conception since 2020. Hx luteal defect, pms, dysmenorrhea, acne with borderline long cycles (for which we started metformin which has helped acne, but has produced nausea at the 1500mg dose.)    oMiz's SFA showed mild decrease in motility and morphology 21. He started proxeed then. was planning to repeat in late July, but has not completed yet: has order. Saline hsg normal.    Currently on femara 20 mg cycle day 2, luteal estrace 1 mg and Progesterone 200mg intravaginally peak plus 3-12   Peak plus 7s have been excellent: 25 and 122 in Aug, 21 and 164 in July. On amoxl for mucus- has improved it significantly. But has caused a little nausea. Did not send me a chart via [facility], but shows me a picture. 28-30 day cycles, 14 day luteal phases, 3-5 days of peak type mucus, + TEBB for 2-3 days still. Currently peak plus 10. Did her peak plus 8 this cycle but I have not receved results. Using LDN 4.5 mg. Has not noted any change in physical symptoms, but has been less emotional perhaps. Perhaps her TEBB is lessening. No side effects noted. PMS and dysmenorrhea: 'has been really bad' but she manages with Ibuprofen. Dysmenorrhea for 1-2 days  + dyschezia donita-menstrual.    Saw DR Ramos in [facility]. She is suspicious of endo and plans surgery 10/22. 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 Patient Active Problem List   Diagnosis    Other ovarian dysfunction (luteal defect, 2020)    Secondary oligomenorrhea (sporadic long cycles with acne/hirsutism, likely PCOS).    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 Wt Readings from Last 3 Encounters:   21 146 lb (66.2 kg)   20 144 lb (65.3 kg)   20 158 lb (71.7 kg)       BP Readings from Last 3 Encounters:   21 122/80   20 120/76   20 122/78       No Known Allergies    Prior to Visit Medications    Medication Sig Taking? Authorizing Provider   metFORMIN (GLUCOPHAGE-XR) 500 MG extended release tablet TAKE 3 TABLETS BY MOUTH DAILY WITH BREAKFAST Yes Wilfredo Martinez MD   ergocalciferol (ERGOCALCIFEROL) 1.25 MG (63650 UT) capsule TAKE 1 CAPSULE BY MOUTH EVERY WEEK Yes Historical Provider, MD   naltrexone (DEPADE) 50 MG tablet 1/2 tab po nightly. Yes Wilfredo Martinez MD   letrozole [facility]) 2.5 MG tablet 8 tabs po once per day on cycle day 2 (after a negative urine pregnancy test)/ Yes Wilfredo Martinez MD   estradiol (ESTRACE) 1 MG tablet TAKE 1 TABLET BY MOUTH DAILY(PEAK PLUS 3-12) Yes Wilfredo Martinez MD   progesterone (PROMETRIUM) 200 MG capsule INSERT 1 CAPSULE INTRAVAGINALLY EVERY NIGHT AS DIRECTED( PEAK PLUS 3-12) Yes Wilfredo Martinez MD   Amoxicillin 500 MG TABS 1 tablet twice daily from cycle day 9 through peak plus 2 Yes Wilfredo Martinez MD   Prenatal Multivit-Min-Fe-FA (PRE-TA FORMULA) TABS Take by mouth daily Yes Historical Provider, MD        Social History     Tobacco Use    Smoking status: Never Smoker    Smokeless tobacco: Never Used   Vaping Use    Vaping Use: Never used   Substance Use Topics    Alcohol use: Yes     Alcohol/week: 1.0 standard drinks     Types: 1 Glasses of wine per week     Comment: ocassional make have liquor    Drug use: Never       Review of Systems As above     Physical Exam  Constitutional:       General: She is not in acute distress. Appearance: Normal appearance. She is not ill-appearing.    Pulmonary:      Effort: Pulmonary effort is normal.   Skin:     General: Skin is warm. Neurological:      General: No focal deficit present. Mental Status: She is alert and oriented to person, place, and time. Mental status is at baseline. Psychiatric:         Mood and Affect: Mood normal.         Behavior: Behavior normal.         Thought Content: Thought content normal.         Judgment: Judgment normal.         ASSESSMENT/PLAN:    1. Dysmenorrhea  - I agree that endometriosis is likely. Its removal, if present, has a high likelihood of reducing pain symptoms and improving fecundity. Hold femara next cycle and avoid pregnancy since her surgery will occur in the luteal phase. 2. PMS (premenstrual syndrome)  - perhaps partial response to LDN (reduced irritability/emotional sx). I will have her gradually increase dose to 25 mg naltrexone nightly. Hold the night before surgery. 3. 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 Continue to monitor    4. Other ovarian dysfunction (luteal defect, 7/2020)  - adequate hormone response to femara and luteal HCG. Plan to continue this after surgery. 5. Secondary oligomenorrhea (sporadic long cycles with acne/hirsutism, likely PCOS). - it is unclear to me if there has been a benefit to adding metformin. She thinks it may have helped acne. will reduce dose to 1000 mg/d due to nausea side effects and uncertainty of benefit. Return in about 3 months (around 12/24/2021), or 2nd cycle after surgery. An  [employer]ignature was used to authenticate this note.     --Chico Plaza MD on 9/24/2021 at 4:07 PM

## 2021-12-22 ENCOUNTER — VIRTUAL VISIT (OUTPATIENT)
Dept: FAMILY MEDICINE CLINIC | Age: 26
End: 2021-12-22
Payer: COMMERCIAL

## 2021-12-22 DIAGNOSIS — N80.9 ENDOMETRIOSIS DETERMINED BY LAPAROSCOPY: ICD-10-CM

## 2021-12-22 DIAGNOSIS — N94.6 DYSMENORRHEA: ICD-10-CM

## 2021-12-22 DIAGNOSIS — N94.3 PMS (PREMENSTRUAL SYNDROME): ICD-10-CM

## 2021-12-22 DIAGNOSIS — N91.4 SECONDARY OLIGOMENORRHEA: ICD-10-CM

## 2021-12-22 DIAGNOSIS — E28.8 OTHER OVARIAN DYSFUNCTION: Primary | ICD-10-CM

## 2021-12-22 DIAGNOSIS — N88.3 FEMALE INFERTILITY ASSOCIATED WITH ANOMALY OF CERVICAL MUCUS: ICD-10-CM

## 2021-12-22 PROCEDURE — 99214 OFFICE O/P EST MOD 30 MIN: CPT | Performed by: FAMILY MEDICINE

## 2021-12-22 RX ORDER — LETROZOLE 2.5 MG/1
TABLET, FILM COATED ORAL
Qty: 10 TABLET | Refills: 2 | Status: SHIPPED | OUTPATIENT
Start: 2021-12-22 | End: 2022-05-23

## 2021-12-22 RX ORDER — PREDNISONE 1 MG/1
TABLET ORAL
Qty: 30 TABLET | Refills: 1 | Status: SHIPPED | OUTPATIENT
Start: 2021-12-22 | End: 2022-09-12

## 2021-12-22 NOTE — Clinical Note
Please call sadia in [place] for her hormone labs from today, November and September. (she has not gone to the lab yet today, so wait until tomorrow).

## 2021-12-22 NOTE — PROGRESS NOTES
2021    TELEHEALTH EVALUATION -- Audio/Visual (During AQFLK- public health emergency)    HPI:    John Paez (:  1995) has requested an audio/video evaluation for the following concern(s):    Chief Complaint   Patient presents with    Follow-up     f/u fertility      Virtual video visit for primary infertility. G0. She and jamila have been seeking conception since 2020.     Hx luteal defect, pms, dysmenorrhea, acne with borderline long cycles (for which we started metformin which has helped acne, but has produced nausea at the 1500mg dose.)    Jamila's SFA showed mild decrease in motility and morphology 21. Abhijit Curry started proxeed then.  repeat SFA was done . Results not available yet. Her saline hsg was normal by Dr Aiden So  2021 (she says the sono did not identify PCO ovarian morhpology. But a report is not included that shows dimensions)    Had laparoscopy was done by DR Jose G Bhandari in [facility]. Dx stage 3 endo. Did biopsies to confirm. But full excision is planned for 2022. Currently she is using LDN (did not tolerate higher doses than 5 mg/d. Femara 20mg since . Was on clomid prior to that. Luteal support with estrace and Prometrium. Last level done November, but I did not get results. She is currently peak plus 7. Will go today for labs at [facility]    Cervical mucus remains low: 3-4 days of peak plus mucus per cycle. Currently using amox for mucus, but this causes diarrhea and nausea. Stopped B6- did not chan to make a difference. Has not tried other meds. Still taking metformin- was formerly helping acne, but not sure now. Cycle length is 28-30 days. Prior to metformin was 30-36 day cycles. Has not tried DIM therapy for acne. Last TSH was 1.2 and prolactin 19 2020  Hormone profile done 2020    These labs were done by Dr Aiden So locally:      Has not had androgen testing.   She reports low libido- has never been high, but has lessened with her fertility treatment quest.    + dyspareunia  + dysmenorrhea first 2 days, with vomiting and hx of absenteeism. + PMS (light cramping, fatigue, irritable, bloating, breast tenderness). Has improved somewhat with current treatment    Her peak plus 7 hormone levels have responded well to progesterone and estradiol treatments, so we have not yet used luteal HCG shots. Review of Systems    Patient Active Problem List   Diagnosis    Other ovarian dysfunction (luteal defect, 2020)    Secondary oligomenorrhea (sporadic long cycles with acne/hirsutism, likely PCOS). 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 Authorizing Provider   estradiol (ESTRACE) 1 MG tablet TAKE 1 TABLET BY MOUTH DAILY(PEAK PLUS 3-12) Yes Andrew Harris MD   progesterone (PROMETRIUM) 200 MG CAPS capsule INSERT 1 CAPSULE INTRAVAGINALLY EVERY NIGHT AS DIRECTED( PEAK PLUS 3-12) Yes Andrew Harris MD   metFORMIN (GLUCOPHAGE-XR) 500 MG extended release tablet TAKE 3 TABLETS BY MOUTH DAILY WITH BREAKFAST Yes Andrew Harris MD   naltrexone (DEPADE) 50 MG tablet 1/2 tab po nightly.  Yes Andrew Harris MD   letrozole [facility]) 2.5 MG tablet 8 tabs po once per day on cycle day 2 (after a negative urine pregnancy test)/ Yes Andrew Harris MD   Amoxicillin 500 MG TABS 1 tablet twice daily from cycle day 9 through peak plus 2 Yes Andrew Harris MD   Prenatal Multivit-Min-Fe-FA (PRE- FORMULA) TABS Take by mouth daily Yes Historical Provider, MD   ergocalciferol (ERGOCALCIFEROL) 1.25 MG (87952 UT) capsule TAKE 1 CAPSULE BY MOUTH EVERY WEEK  Patient not taking: Reported on 2021  Historical Provider, MD       Social History     Tobacco Use    Smoking status: Never Smoker    Smokeless tobacco: Never Used   Vaping Use    Vaping Use: Never used   Substance Use Topics    Alcohol use: Yes     Alcohol/week: 1.0 standard drink     Types: 1 Glasses of wine per week     Comment: ocassional make have liquor    Drug use: Never            PHYSICAL EXAMINATION:  Physical Exam  Constitutional:       General: She is not in acute distress. Appearance: Normal appearance. She is not ill-appearing. Pulmonary:      Effort: Pulmonary effort is normal.   Skin:     Findings: Rash (acne @ chin, upper lip) present. Neurological:      General: No focal deficit present. Mental Status: She is alert and oriented to person, place, and time. Mental status is at baseline. Psychiatric:         Mood and Affect: Mood normal.         Behavior: Behavior normal.         Thought Content: Thought content normal.         Judgment: Judgment normal.          ASSESSMENT/PLAN:    1. Other ovarian dysfunction (luteal defect, 7/2020)  - incr femara to 25 mg  - continue luteal support and peak plus 7 monitoring (call for past levels)    2. Dysmenorrhea  - now known to be caused staci endometriosis. Surgery planned soon    3. 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 PMS (premenstrual syndrome)  - improved somewht with luteal support and naltrexone    5. Secondary oligomenorrhea (sporadic long cycles with acne/hirsutism, likely PCOS). - has never been tested for androgen levels. Continue metformin since her cycles have shortened on this. Currently has normal cycle length. - 17-Hydroxyprogesterone; Future  - Androstenedione; Future  - DHEA-Sulfate; Future  - TSH WITH REFLEX TO FT4; Future  - Testosterone, free, total; Future    Try Diindolylmethane 200mg/d for acne.     6. Endometriosis determined by laparoscopy- dr Yuliana Buchanan, stage 3, 9/2021      Return in about 4 months (around 4/22/2022) for follow up fertility. Elvia Lerma is a 31 y.o. female being evaluated by a Virtual Visit (video visit) encounter to address concerns as mentioned above. A caregiver was present when appropriate. Due to this being a TeleHealth encounter (During MIXXZ-40 public health emergency), evaluation of the following organ systems was limited: Vitals/Constitutional/EENT/Resp/CV/GI//MS/Neuro/Skin/Heme-Lymph-Imm. Pursuant to the emergency declaration under the [address] [place] [address], [address] [place] [address] and the [employer] Act, this Virtual Visit was conducted with patient's (and/or legal guardian's) consent, to reduce the patient's risk of exposure to COVID-19 and provide necessary medical care. The patient (and/or legal guardian) has also been advised to contact this office for worsening conditions or problems, and seek emergency medical treatment and/or call 911 if deemed necessary. Patient identification was verified at the start of the visit: Yes    Services were provided through a video synchronous discussion virtually to substitute for in-person clinic visit. Patient and provider were located at their individual homes. --Price Akers MD on 12/22/2021 at 8:02 AM    An electronic signature was used to authenticate this note.

## 2022-01-28 ENCOUNTER — TELEPHONE (OUTPATIENT)
Dept: FAMILY MEDICINE CLINIC | Age: 27
End: 2022-01-28

## 2022-01-28 RX ORDER — ESTRADIOL 1 MG/1
TABLET ORAL
Qty: 60 TABLET | Refills: 0 | Status: SHIPPED | OUTPATIENT
Start: 2022-01-28 | End: 2022-03-10

## 2022-01-28 NOTE — TELEPHONE ENCOUNTER
Pt calling stating that she wanted to make sure that we were receiving lab results. Stated that she goes to a different lab and was told the office was having a hard time getting results. Stated that she did not receive anything about labs needing to be done from last vv in December. Stated that they usually     Printed out lab order from 12.22.2021 and sent to pt.     Please Advise if having difficulty getting lab results  Pt can be reached at [phone]

## 2022-02-14 DIAGNOSIS — N91.4 SECONDARY OLIGOMENORRHEA: ICD-10-CM

## 2022-03-10 RX ORDER — ESTRADIOL 1 MG/1
TABLET ORAL
Qty: 60 TABLET | Refills: 0 | Status: SHIPPED | OUTPATIENT
Start: 2022-03-10 | End: 2022-08-22

## 2022-04-21 ENCOUNTER — OFFICE VISIT (OUTPATIENT)
Dept: FAMILY MEDICINE CLINIC | Age: 27
End: 2022-04-21
Payer: COMMERCIAL

## 2022-04-21 VITALS
BODY MASS INDEX: 22.66 KG/M2 | OXYGEN SATURATION: 98 % | DIASTOLIC BLOOD PRESSURE: 72 MMHG | HEART RATE: 85 BPM | WEIGHT: 141 LBS | SYSTOLIC BLOOD PRESSURE: 120 MMHG | HEIGHT: 66 IN

## 2022-04-21 DIAGNOSIS — N88.3 FEMALE INFERTILITY ASSOCIATED WITH ANOMALY OF CERVICAL MUCUS: ICD-10-CM

## 2022-04-21 DIAGNOSIS — N94.3 PMS (PREMENSTRUAL SYNDROME): ICD-10-CM

## 2022-04-21 DIAGNOSIS — E28.8 OTHER OVARIAN DYSFUNCTION: ICD-10-CM

## 2022-04-21 DIAGNOSIS — N91.4 SECONDARY OLIGOMENORRHEA: ICD-10-CM

## 2022-04-21 DIAGNOSIS — N80.9 ENDOMETRIOSIS DETERMINED BY LAPAROSCOPY: Primary | ICD-10-CM

## 2022-04-21 DIAGNOSIS — N94.6 DYSMENORRHEA: ICD-10-CM

## 2022-04-21 PROCEDURE — 99215 OFFICE O/P EST HI 40 MIN: CPT | Performed by: FAMILY MEDICINE

## 2022-04-21 ASSESSMENT — PATIENT HEALTH QUESTIONNAIRE - PHQ9
1. LITTLE INTEREST OR PLEASURE IN DOING THINGS: 0
SUM OF ALL RESPONSES TO PHQ QUESTIONS 1-9: 0
2. FEELING DOWN, DEPRESSED OR HOPELESS: 0
SUM OF ALL RESPONSES TO PHQ QUESTIONS 1-9: 0
SUM OF ALL RESPONSES TO PHQ9 QUESTIONS 1 & 2: 0
SUM OF ALL RESPONSES TO PHQ QUESTIONS 1-9: 0
SUM OF ALL RESPONSES TO PHQ QUESTIONS 1-9: 0

## 2022-05-04 NOTE — PROGRESS NOTES
2020    TELEHEALTH EVALUATION -- Audio/Visual (During SPUDL-46 public health emergency)    HPI:    Aquilino Vance (:  1995) has requested an audio/video evaluation for the following concern(s):    Chief Complaint   Patient presents with    Follow-up     f/u fertility/napro        F/u on menstrual irregularity, variable luteal phase 10-17 days, PMS, pelvic pain/cramping (nicki after running, dysmenorrhea), with delayed conception (fertility focused intercourse since ). Also has longish cycles at times also- ~36 days is longest, but is not uncommon. + facial acne. [place] [employer] is [employer] in [place]. Completed luteal hormone profile last cycle (currently cycle day 8). Her profile demonstrated abrupt drop in progesterone from peak plus 5 to 7 (level was 14.2, then 6.6 by peak plus 7) and remained abnormally low the remainder of her luteal phase. Estradiol levels were normal until peak plus 10, when it was 50. Luteal phase was 11 days, normal is 10-12 days normally. She continues to have acne/hirsutism. Her prolactin and TSH were normal     No new symptoms or concerns. Review of Systems   All other systems reviewed and are negative. Patient Active Problem List   Diagnosis    Menstrual irregularity        Prior to Visit Medications    Medication Sig Taking? Authorizing Provider   Prenatal Multivit-Min-Fe-FA (PRE-TA FORMULA) TABS Take by mouth daily Yes Historical Provider, MD       Social History     Tobacco Use    Smoking status: Never Smoker    Smokeless tobacco: Never Used   Substance Use Topics    Alcohol use: Yes     Alcohol/week: 1.0 standard drinks     Types: 1 Glasses of wine per week     Frequency: 2-4 times a month     Drinks per session: 1 or 2     Binge frequency: Less than monthly     Comment: ocassional make have liquor    Drug use: Never            PHYSICAL EXAMINATION:  Physical Exam  Constitutional:       General: She is not in acute distress. Medical Nutrition Therapy   Metabolic and Bariatric Surgery         Supervised diet and exercise preparation  Visit 3 out of 3  Pt reports:      Changes in eating patterns to promote health are noted below on the goals number 19-22    Vitals: Wt Readings from Last 3 Encounters:   05/04/22 (!) 388 lb (176 kg)   04/08/22 (!) 387 lb (175.5 kg)   03/03/22 (!) 387 lb (175.5 kg)         Nutrition Assessment:   PES: Knowledge deficit related to healthy behaviors that support weight management post weight loss surgery as evidenced by Body mass index is 47.23 kg/m². Nutrition Assessment of Goal Attainment:  TREATMENT GOALS:    1. Pt  Completed 5 out of 5 goals. 2.TREATMENT GOALS FOR UPCOMING WEEK: continue all previous goals and add: # 0    All goals were planned with and agreed on by the patient. appt # NA G What is your next step? C 1 2 3 4 5 6 7 8 9     1  1 I will read the education binder provided to me and the    0               2 I will make my pschological evaluation appoinment. x              2  3 I will bring this goal card to every appointment. 100 100 100            x 4 I will eliminate all tobacco/nicotine. x 5 I will limit alcoholic beverages to 5-0XY per week. 0  6 I will limit dining out to 3 times per week or less. 4x/week or less. x 100             2  7 I will eliminate sugary beverages. x  90 90            x 8 I will eliminate carbonated beverages. x 9 I will eliminate drinking with a straw. x 10 I will limit caffeinated beverages to 16oz daily. 2  11 I will limit log my exercise daily. 100 100 100           2  12 I will determine my calcium and mvi plan. x   100             13 I will have 1-2 servings of lean protein present at each meal and minimeal. x              0  14 I will eat every 3-5 hours. x 100               15 I will drink 64oz of fluid daily.  x              2  16 I will eat slowly during meals and Appearance: Normal appearance. She is not ill-appearing. Pulmonary:      Effort: Pulmonary effort is normal.   Skin:     General: Skin is warm. Neurological:      General: No focal deficit present. Mental Status: She is alert and oriented to person, place, and time. Mental status is at baseline. Psychiatric:         Mood and Affect: Mood normal.         Behavior: Behavior normal.         Thought Content: Thought content normal.         Judgment: Judgment normal.          ASSESSMENT/PLAN:    1. Other ovarian dysfunction (luteal defect, 7/2020)  - discussed that her abnormal luteal hormone levels demonstrate dysfunction of the ovulatory process. discussed the theory that luteal support of progesterone and estradiol (to return to physiologic normal levels) can improve follicular function in the subsequent cycle. The treatment also often improves PMS and dysmenorrhea- she will observe for that. Begin intravaginal Prometrium 200mg and oral estrace 1 mg peak plus 3-12 and continue to  Monitor peak plus 7 progesterone and estradiol levels to track progress. - Progesterone; Standing  - Estradiol; Standing    2. Secondary oligomenorrhea (sporadic long cycles with acne/hirsutism, likely PCOS). - discussed that I believe it very likely that she has a mild form of PCOS. Topical rx OK to use for acne. discussed the role of insulin sensitizers in improving PCOS outcomes in fertility and pregnancy. If she does not become pregnant in a few cycles, or if her cycles begin to lengthen again, she is OK with starting metformin. 3. Female infertility (TTC since 1/20)  - discussed that if conception has not occurred by 1/21, I would recommend pelvic sono, SFA and HSG testing. Return in about 3 months (around 11/12/2020) for follow up fertility, OK for Virtual Video Visit. Belgica Buckley is a 21 y.o. female being evaluated by a Virtual Visit (video visit) encounter to address concerns as mentioned above.   JAMEE snacks. 100            x 17 I will limit fluids 4oz before after and during meals. x 18 I will eat protein first at all meals followed by vegetables,  Fruit and lastly whole grains. 23 My first weight neutral approach is:                 20 My second weight neutral approach is:                 21  My Thirds weight neutral approach is:                 22                  23                  24                  25                    Questions asked for goal understanding:                                                  Do you understand your goals? Do you have the information you need to achieve your goals? Do you have any questions  right now? []  Consistent goal achievement in the program thus far and further success with goals is expected. []  Unable to consistently make progress in goal achievement. At this time patient is not moving forward  in developing the skills needed for success after surgery. Plan:    Continue to follow monthly and review goals.          []  Nutrition visits complete    [x] caregiver was present when appropriate. Due to this being a TeleHealth encounter ([facility]), evaluation of the following organ systems was limited: Vitals/Constitutional/EENT/Resp/CV/GI//MS/Neuro/Skin/Heme-Lymph-Imm. Pursuant to the emergency declaration under the [address] [place] [address], [address] [place] [address] and the [employer] Act, this Virtual Visit was conducted with patient's (and/or legal guardian's) consent, to reduce the patient's risk of exposure to COVID-19 and provide necessary medical care. The patient (and/or legal guardian) has also been advised to contact this office for worsening conditions or problems, and seek emergency medical treatment and/or call 911 if deemed necessary. Patient identification was verified at the start of the visit: Yes    Total time spent on this encounter: 45 min    Services were provided through a video synchronous discussion virtually to substitute for in-person clinic visit. Patient and provider were located at their individual homes. --Kan Caballero MD on 8/12/2020 at 8:00 AM    An electronic signature was used to authenticate this note.

## 2022-05-23 RX ORDER — LETROZOLE 2.5 MG/1
TABLET, FILM COATED ORAL
Qty: 10 TABLET | Refills: 2 | Status: SHIPPED | OUTPATIENT
Start: 2022-05-23 | End: 2022-08-02

## 2022-06-07 DIAGNOSIS — E28.8 OTHER OVARIAN DYSFUNCTION: ICD-10-CM

## 2022-06-17 ENCOUNTER — TELEPHONE (OUTPATIENT)
Dept: FAMILY MEDICINE CLINIC | Age: 27
End: 2022-06-17

## 2022-06-17 DIAGNOSIS — E28.8 OTHER OVARIAN DYSFUNCTION: Primary | ICD-10-CM

## 2022-06-17 NOTE — TELEPHONE ENCOUNTER
Pt wants new orders for Estradiol and progesterone she wants to get this drawn Monday or Tuesday she is going to be out of town and wants to go to a different lab   Please email when complete     Please advise when complete

## 2022-06-20 ENCOUNTER — NURSE ONLY (OUTPATIENT)
Dept: LAB | Age: 27
End: 2022-06-20

## 2022-06-20 DIAGNOSIS — E28.8 OTHER OVARIAN DYSFUNCTION: ICD-10-CM

## 2022-06-20 LAB
ESTRADIOL LEVEL: 83.4 PG/ML (ref 27–314)
PROGESTERONE LEVEL: 15.79 NG/ML

## 2022-06-24 RX ORDER — CHORIONIC GONADOTROPIN 10000 UNIT
KIT INTRAMUSCULAR
Qty: 1 EACH | Refills: 3 | Status: SHIPPED | OUTPATIENT
Start: 2022-06-24

## 2022-06-24 NOTE — PROGRESS NOTES
Please fax to [phone] [facility], [address] [place] [address] [address].

## 2022-07-12 ENCOUNTER — PATIENT MESSAGE (OUTPATIENT)
Dept: FAMILY MEDICINE CLINIC | Age: 27
End: 2022-07-12

## 2022-07-18 DIAGNOSIS — E28.8 OTHER OVARIAN DYSFUNCTION: Primary | ICD-10-CM

## 2022-08-02 ENCOUNTER — PATIENT MESSAGE (OUTPATIENT)
Dept: FAMILY MEDICINE CLINIC | Age: 27
End: 2022-08-02

## 2022-08-02 DIAGNOSIS — E28.8 OTHER OVARIAN DYSFUNCTION: Primary | ICD-10-CM

## 2022-08-02 RX ORDER — LETROZOLE 2.5 MG/1
TABLET, FILM COATED ORAL
Qty: 10 TABLET | Refills: 2 | Status: SHIPPED | OUTPATIENT
Start: 2022-08-02

## 2022-08-11 NOTE — TELEPHONE ENCOUNTER
Please fax new standing order for progesterone and estradiol   Thanks. Fax is [phone]  [facility].

## 2022-08-11 NOTE — TELEPHONE ENCOUNTER
From: John Paez  To: Dr. Maria Fernadna Medrano: 8/2/2022 12:40 PM EDT  Subject: Peak +7 results    Hi Dr. Rj Palomino,   I'm wondering if you have received my results from 7/21? I know sometimes my lab takes awhile to send them. Thanks!

## 2022-08-22 ENCOUNTER — TELEMEDICINE (OUTPATIENT)
Dept: FAMILY MEDICINE CLINIC | Age: 27
End: 2022-08-22
Payer: COMMERCIAL

## 2022-08-22 DIAGNOSIS — N80.9 ENDOMETRIOSIS DETERMINED BY LAPAROSCOPY: Primary | ICD-10-CM

## 2022-08-22 DIAGNOSIS — N91.4 SECONDARY OLIGOMENORRHEA: ICD-10-CM

## 2022-08-22 DIAGNOSIS — N88.3 FEMALE INFERTILITY ASSOCIATED WITH ANOMALY OF CERVICAL MUCUS: ICD-10-CM

## 2022-08-22 DIAGNOSIS — N94.3 PMS (PREMENSTRUAL SYNDROME): ICD-10-CM

## 2022-08-22 DIAGNOSIS — N97.8 ENCOUNTER FOR MALE FACTOR INFERTILITY IN FEMALE PATIENT: ICD-10-CM

## 2022-08-22 DIAGNOSIS — E28.8 OTHER OVARIAN DYSFUNCTION: ICD-10-CM

## 2022-08-22 DIAGNOSIS — Z31.81 ENCOUNTER FOR MALE FACTOR INFERTILITY IN FEMALE PATIENT: ICD-10-CM

## 2022-08-22 PROCEDURE — 99214 OFFICE O/P EST MOD 30 MIN: CPT | Performed by: FAMILY MEDICINE

## 2022-08-22 RX ORDER — PIOGLITAZONEHYDROCHLORIDE 15 MG/1
15 TABLET ORAL DAILY
Qty: 90 TABLET | Refills: 1 | Status: SHIPPED | OUTPATIENT
Start: 2022-08-22

## 2022-08-22 SDOH — ECONOMIC STABILITY: FOOD INSECURITY: WITHIN THE PAST 12 MONTHS, THE FOOD YOU BOUGHT JUST DIDN'T LAST AND YOU DIDN'T HAVE MONEY TO GET MORE.: NEVER TRUE

## 2022-08-22 SDOH — ECONOMIC STABILITY: FOOD INSECURITY: WITHIN THE PAST 12 MONTHS, YOU WORRIED THAT YOUR FOOD WOULD RUN OUT BEFORE YOU GOT MONEY TO BUY MORE.: NEVER TRUE

## 2022-08-22 ASSESSMENT — SOCIAL DETERMINANTS OF HEALTH (SDOH): HOW HARD IS IT FOR YOU TO PAY FOR THE VERY BASICS LIKE FOOD, HOUSING, MEDICAL CARE, AND HEATING?: NOT HARD AT ALL

## 2022-08-22 NOTE — PROGRESS NOTES
2022    TELEHEALTH EVALUATION -- Audio/Visual (During XXYRU-55 public health emergency)    HPI:    Yulissa Rangel (:  1995) has requested an audio/video evaluation for the following concern(s):    F/u primary infertility. Since 2020. [de-identified].   is jamila. 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 But this is emotionally difficult to have them and give them back. Stage 3 endo; surgically removed 2022 by Dr Deforest Mortimer. tubes patent. [facility] says the experience was good. Has healed up well. Since surgery has mild cramps and PMS, but feels 'much better' overall. For PCOS she uses metformin 1000mg. (Does not tolerate higher doses) Cycles q 30-32 days. Acne is 'mostly improved' but 'still there.'  Diindolylmethane 200mg/d has improved acne. Has not tried Actos. Endometriosis: 'significantly improved' says still feels lower anterior abd pressure the last couple days of cycle and first 2 days of cycle. Sometimes enough to take ibuprofen, which helps. This is increasing as she gets farther from her surgery. So this may be scar tissue. Ovarian stimulation: Femara 25 mg. 'for quite a while' (this dose for past year). On Clomid she had hot flashes and reduced mucus at higher doses. She is currently peak plus 6. For luteal support, using HCG shots without extra progesterone and estradiol. Her last peak plus 7 was to goal: progesterone 54 and estradiol 290. Going for labs tomorrow. She notes some premenstrual bloating on HCG, but other symptoms are stable. Prolactin 23 22  TSH last done 22: 0.965    SFA done : total count 37.8 million/ml, normal motility, low morphology of 1%. He is taking Proxeed. Has not had recheck. But this should be adequate for conception. Cervical mucus: 3 days of peak plus this current cycle. Typical is 4-5 days on B6, + prednisone. She is a teacher. School starts tomorrow. [employer] at [place], [place]. She tried low dose naltrexone prior to surgery. She did not notice any benefit or side effects. Review of Systems As above     Patient Active Problem List   Diagnosis    Other ovarian dysfunction (luteal defect, 2020)    Secondary oligomenorrhea (sporadic long cycles with acne/hirsutism, likely PCOS). 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 Authorizing Provider   letrozole (FEMARA) 2.5 MG tablet TAKE 10 TABLET BY MOUTH ON CYCLE DAY 2 AFTER NEGATIVE PREGNANCY TEST Yes Vinita Browne MD   chorionic gonadotropin 57954 units SOLR 2500 IU subcutaneous as directed, peak plus 3,5,7 and 9 each cycle. Yes Laurence Ash MD   predniSONE (DELTASONE) 5 MG tablet 1 tab po daily, from cycle day 9 through peak plus 2 each cycle Yes Laurence Ash MD   metFORMIN (GLUCOPHAGE-XR) 500 MG extended release tablet TAKE 3 TABLETS BY MOUTH DAILY WITH BREAKFAST Yes Laurence Ash MD   Prenatal Multivit-Min-Fe-FA (PRE-TA FORMULA) TABS Take by mouth daily Yes Historical Provider, MD   estradiol (ESTRACE) 1 MG tablet TAKE 2 TABLETS BY MOUTH DAILY(PEAK PLUS 3-12)  Laurence Ash MD   progesterone (PROMETRIUM) 200 MG CAPS capsule INSERT 1 CAPSULE INTRAVAGINALLY EVERY NIGHT AS DIRECTED( PEAK PLUS 3-12)  Laurence Ash MD       Social History     Tobacco Use    Smoking status: Never    Smokeless tobacco: Never   Vaping Use    Vaping Use: Never used   Substance Use Topics    Alcohol use: Yes     Alcohol/week: 1.0 standard drink     Types: 1 Glasses of wine per week     Comment: ocassional make have liquor    Drug use: Never            PHYSICAL EXAMINATION:  Physical Exam  Constitutional:       General: She is not in acute distress. Appearance: Normal appearance. She is not ill-appearing.    Pulmonary:      Effort: Pulmonary effort is normal.   Neurological:      General: No focal deficit present. Mental Status: She is alert and oriented to person, place, and time. Mental status is at baseline. Psychiatric:         Mood and Affect: Mood normal.         Behavior: Behavior normal.         Thought Content: Thought content normal.         Judgment: Judgment normal.        ASSESSMENT/PLAN:    1. Endometriosis determined by laparoscopy (Dr Warden Pereira 2/22)  - symptoms (pelvic pain) improved, not completely resolved. - discussed inflammatory nature of endo and the supplements some endo experts recommend (Dr Caitie Perales)    2. Other ovarian dysfunction (luteal defect, 7/2020)  - add clomid 50 mg cycle days 3,4,5 to Femara 25 mg cycle day 2.  - good luteal response to HCG (2500 IU peak plus 3,5,7,9)- continue this. Due for peak plus 7 progesterone and estradiol levels tomorrow. 3. Secondary oligomenorrhea (sporadic long cycles with acne/hirsutism, likely PCOS). - cycles still a bit long and continues to have stubborn infertility. Since she cannot increase metformin dose further (GI symptoms), will add actos 15 mg. Will continue metformin with pregnancy (first TM), but stop Actos. - continue Diindolylmethane 200mg/d for acne. 4. 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 PMS (premenstrual syndrome)  - did not see benefit from low dose naltrexone- OK to not restart this. Overall is improved since endo surgery    6. 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 Return in about 3 months (around 11/22/2022) for follow up fertility.     Lebron Reddy was evaluated through a synchronous (real-time) audio-video encounter. The patient (or guardian if applicable) is aware that this is a billable service, which includes applicable co-pays. This Virtual Visit was conducted with patient's (and/or legal guardian's) consent. The visit was conducted pursuant to the emergency declaration under the [address] [place] [address] authority and the [employer] and [employer] Act. Patient identification was verified, and a caregiver was present when appropriate. The patient was located in a state where the provider was licensed to provide care. Patient identification was verified at the start of the visit: Yes    Services were provided through a video synchronous discussion virtually to substitute for in-person clinic visit. Patient and provider were located at their individual homes. --Dusty Negrete MD on 8/22/2022 at 8:46 AM    An electronic signature was used to authenticate this note.

## 2022-08-22 NOTE — PATIENT INSTRUCTIONS
Dr Hawa Cordova recommends the following supplements for his endometriosis patients seeking conception:  prenatal vitamin daily  Omega-3 fish oil (1000 DHA per day) with vitamin D3  methylated folate 5 MTHF ([address]) 1 mg twice daily  ASA 81 mg daily  B6 500 mg SR daily ([address] [employer])  [place] CM  ubiquinol (coQ10) 300 mg 2 times daily  jb-inositol 2g per day

## 2022-08-24 ENCOUNTER — PATIENT MESSAGE (OUTPATIENT)
Dept: FAMILY MEDICINE CLINIC | Age: 27
End: 2022-08-24

## 2022-08-25 ENCOUNTER — TELEPHONE (OUTPATIENT)
Dept: FAMILY MEDICINE CLINIC | Age: 27
End: 2022-08-25

## 2022-08-25 NOTE — TELEPHONE ENCOUNTER
From: Kavon Chery  To: Dr. Rodríguez Ro: 8/24/2022 7:31 PM EDT  Subject: Peak +7 8/23    Hi Dr. Donis Bakre,   Here are my p+7 results from this cycle in case [facility] doesn't get them to you. Thanks!

## 2022-08-25 NOTE — TELEPHONE ENCOUNTER
Please notify Nancy Manley that [facility] is typically prompt and easy to work with, if that is the preferred pharmacy of her insurance. Will resend clomid order there.

## 2022-08-25 NOTE — TELEPHONE ENCOUNTER
Pt needs a new prescription for clomid   Pt stated [facility] said this needs to be a specialty pharmacy pt was directed to [place] pharmacy she stated she has been on clomid before and she thinks she may have had it filled at [facility]       Please advise what the pt needs to do to get this filled

## 2022-08-25 NOTE — TELEPHONE ENCOUNTER
Called pharmacy and the rejection from insurance if that the medication has to be done with a speciality pharmacy   Pt said she was referred to [place] pharmacy in [place]   Pt wants to know if there is a speciality pharmacy that may be closer   That would be able to send the clomid to   Please advise

## 2022-08-25 NOTE — TELEPHONE ENCOUNTER
Clomid tablets are normally filled at a regular commercial pharmacy. Can we call her pharmacy and confirm this and see what the issue is? It may be that it is her insurance requiring all medicines related to fertility be filled by a specialty pharmacy that they have contracted with.

## 2022-09-02 ENCOUNTER — PATIENT MESSAGE (OUTPATIENT)
Dept: FAMILY MEDICINE CLINIC | Age: 27
End: 2022-09-02

## 2022-09-02 RX ORDER — PROGESTERONE 200 MG/1
CAPSULE ORAL
Qty: 60 CAPSULE | Refills: 3 | Status: SHIPPED | OUTPATIENT
Start: 2022-09-02

## 2022-09-02 NOTE — TELEPHONE ENCOUNTER
From: Sebastian Collins  To: Dr. Ronaldo Solorio: 9/2/2022 7:23 AM EDT  Subject: Question    This morning I took a pregnancy test since it is 17 days past ovulation and I suspected my period was beginning, so I wanted to make sure it was negative before taking Femara on CD 2. I did begin bleeding and cramping like a regular period, but I also got a super faint positive line on the pregnancy test. I assume this is likely just the last of the hcg from my shots. I will test again tomorrow before taking Femara, but I just wanted to check if this was the right course of action or if there was anything else that I should do?

## 2022-09-12 RX ORDER — PREDNISONE 1 MG/1
TABLET ORAL
Qty: 30 TABLET | Refills: 1 | Status: SHIPPED | OUTPATIENT
Start: 2022-09-12

## 2022-09-14 RX ORDER — METFORMIN HYDROCHLORIDE 500 MG/1
TABLET, EXTENDED RELEASE ORAL
Qty: 90 TABLET | Refills: 5 | Status: SHIPPED | OUTPATIENT
Start: 2022-09-14

## 2022-11-17 RX ORDER — CHORIONIC GONADOTROPIN 10000 UNIT
KIT INTRAMUSCULAR
Qty: 1 EACH | Refills: 3 | Status: SHIPPED | OUTPATIENT
Start: 2022-11-17 | End: 2022-11-18 | Stop reason: SDUPTHER

## 2022-11-29 ENCOUNTER — TELEMEDICINE (OUTPATIENT)
Dept: FAMILY MEDICINE CLINIC | Age: 27
End: 2022-11-29
Payer: COMMERCIAL

## 2022-11-29 DIAGNOSIS — N91.4 SECONDARY OLIGOMENORRHEA: ICD-10-CM

## 2022-11-29 DIAGNOSIS — N97.8 ENCOUNTER FOR MALE FACTOR INFERTILITY IN FEMALE PATIENT: ICD-10-CM

## 2022-11-29 DIAGNOSIS — N88.3 FEMALE INFERTILITY ASSOCIATED WITH ANOMALY OF CERVICAL MUCUS: ICD-10-CM

## 2022-11-29 DIAGNOSIS — Z31.81 ENCOUNTER FOR MALE FACTOR INFERTILITY IN FEMALE PATIENT: ICD-10-CM

## 2022-11-29 DIAGNOSIS — N80.9 ENDOMETRIOSIS DETERMINED BY LAPAROSCOPY: ICD-10-CM

## 2022-11-29 DIAGNOSIS — E28.8 OTHER OVARIAN DYSFUNCTION: Primary | ICD-10-CM

## 2022-11-29 PROCEDURE — 99214 OFFICE O/P EST MOD 30 MIN: CPT | Performed by: FAMILY MEDICINE

## 2022-11-29 RX ORDER — MULTIVITAMIN WITH IRON
100 TABLET ORAL DAILY
COMMUNITY

## 2022-11-29 RX ORDER — ACETAMINOPHEN 160 MG
TABLET,DISINTEGRATING ORAL DAILY
COMMUNITY

## 2022-11-29 NOTE — PATIENT INSTRUCTIONS
Belle Becker MD, MPH  Reproductive Medicine and Immunology  [address], [address] [facility]  [phone]    Marjorie Healy MD  [facility] Reproductive Immunology  [address] [place] [address] [address]   [facility], [address]  PHONE [phone]  FAX [phone]      Anti-inflammatory diet: gluten free, dairy free, egg free.

## 2022-11-29 NOTE — PROGRESS NOTES
2022    not currently breastfeeding. Chung Atkinson (:  1995) is a 28 y.o. 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   is Reza Godinez. 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 Hx endo, PCOS, luteal defect. Stage 3 endo; surgically removed 2022 by Dr Ursula Neely. tubes patent. Still taking metformin, added actos 15 mg last visit in August.  No change in her cycles has been seen. Still has some acne. Cycle length 33-35 days. Mucus 2-5 days (usually more than 2-3 days) - using low dose prednisone + B6 100mg for mucus enhancement  Menses 5-7 days, 2 days of heavy flow with clots. Continues to have TEBB x 2 days  LMP was   Currently cycle day 21, peak plus 4 today. Current meds:   Femara 25 mg cycle day 2, then clomid 100 mg cycle days 3,4,5 (increased to 100mg for current cycle). Trigger HCG 5000 IU (unable to use it this past cycle- urine LH did not show a peak  Luteal support: HCG 1250 IU peak plus 3,5,7,9. Last peak plus 7 showed estradiol 324, progesterone 66.8. SFA done : total count 37.8 million/ml, normal motility, low morphology of 1%. He is taking Fertilaid   Has not had recheck. But this should be adequate for conception. Symptoms: pelvic pain is gradually worsening and is about where it was prior to surgery. Her local OB ordered pelvic floor therapy and has had 6 sessions. Feels pain with menstruation and with intercourse. Occ premenstrually. She has not discussed return of pain with Dr Cliff Valdez. She did try and adhere to 'anti-inflammation' diet for 3 months in  (followed it strictly then) and noted that it did seem to shorten her cycles, but no less pain or heavy menstruation.   She has 'loosely' followed this diet since (more produce limits dairy, sugars, but not gluten free)    Prolactin 23 22  TSH last done 22: 0.965    No chromosome analysis previously    Patient Active Problem List   Diagnosis    Other ovarian dysfunction (luteal defect, 2020)    Secondary oligomenorrhea (sporadic long cycles with acne/hirsutism, likely PCOS). 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 Wt Readings from Last 3 Encounters:   22 141 lb (64 kg)   21 146 lb (66.2 kg)   20 144 lb (65.3 kg)       BP Readings from Last 3 Encounters:   22 120/72   21 122/80   20 120/76       No Known Allergies    Prior to Visit Medications    Medication Sig Taking? Authorizing Provider   Cholecalciferol (VITAMIN D3) 50 MCG ( UT) CAPS Take by mouth daily Yes Historical Provider, MD   vitamin B-6 (PYRIDOXINE) 100 MG tablet Take 100 mg by mouth daily Yes Historical Provider, MD   chorionic gonadotropin 52541 units SOLR 2500 IU subcutaneous as directed, peak plus 3,5,7 and 9 each cycle.  Yes Raul Mock MD   clomiPHENE (CLOMID) 50 MG tablet Take 2 tablets by mouth daily (days 3,4 and 5 each cycle) Yes Raul Mock MD   metFORMIN (GLUCOPHAGE-XR) 500 MG extended release tablet TAKE 3 TABLETS BY MOUTH DAILY WITH BREAKFAST Yes Raul Mock MD   predniSONE (DELTASONE) 5 MG tablet TAKE 1 TABLET BY MOUTH DAILY FROM CYCLE DAY 9 THROUGH PEAK PLUS 2 EACH CYCLE Yes Thien Browne MD   pioglitazone (ACTOS) 15 MG tablet Take 1 tablet by mouth daily Yes Raul Mock MD   letrozole ([address] [facility]) 2.5 MG tablet TAKE 10 TABLET BY MOUTH ON CYCLE DAY 2 AFTER NEGATIVE PREGNANCY TEST Yes Thien Browne MD   Prenatal Multivit-Min-Fe-FA (PRE- FORMULA) TABS Take by mouth daily Yes Historical Provider, MD   progesterone (PROMETRIUM) 200 MG CAPS capsule 2 caps intravaginally each night  Birdie Paulino MD        Social History     Tobacco Use    Smoking status: Never    Smokeless tobacco: Never   Vaping Use    Vaping Use: Never used   Substance Use Topics    Alcohol use: Yes     Alcohol/week: 1.0 standard drink     Types: 1 Glasses of wine per week     Comment: ocassional make have liquor    Drug use: Never       Review of Systems As above     Physical Exam  Constitutional:       General: She is not in acute distress. Appearance: Normal appearance. She is not ill-appearing. Pulmonary:      Effort: Pulmonary effort is normal.   Skin:     Comments: Mild chin acne   Neurological:      General: No focal deficit present. Mental Status: She is alert and oriented to person, place, and time. Mental status is at baseline. Psychiatric:         Mood and Affect: Mood normal.         Behavior: Behavior normal.         Thought Content: Thought content normal.         Judgment: Judgment normal.       ASSESSMENT/PLAN:    1. Other ovarian dysfunction (luteal defect, 7/2020)  - good luteal hormone response to current regimen. Continue Femara/clomid, luteal HCG as above. She will try one more cycle of trigger HCG, using LH meter to help target peak day. - if not pregnant after next cycle, they will take a break from treatment for infertility. 2. Endometriosis determined by laparoscopy (Dr Cecelia Hills 2/22)  - discussed trying the so-called 'anti inflammation diet' for pain (anecdotal successes reported), though research is scanty. She has never attempted a gluten-free diet so is curious if it would make a difference. (gluten free, dairy free, egg free for 2 cycle to observe effect on pain mostly.)  - plans to follow up with DR Ramos if they wish to pursue future treatment for primary infertility or endometriosis    3.  Encounter for male factor infertility in female patient  - declines further testing    4. 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 - due to prolonged primary infertility, advised chromosome testing (will  mail orders for her and [facility])    5. Secondary oligomenorrhea (sporadic long cycles with acne/hirsutism, likely PCOS). - continue metformin/actos for now. Return in about 6 months (around 5/29/2023) for follow up fertility. An  electronicsignature was used to authenticate this note.     --Leatha Samuels MD on 11/29/2022 at 3:25 PM

## 2022-12-27 ENCOUNTER — TELEPHONE (OUTPATIENT)
Dept: FAMILY MEDICINE CLINIC | Age: 27
End: 2022-12-27

## 2023-02-27 RX ORDER — PIOGLITAZONEHYDROCHLORIDE 15 MG/1
15 TABLET ORAL DAILY
Qty: 90 TABLET | Refills: 1 | Status: SHIPPED | OUTPATIENT
Start: 2023-02-27

## 2023-06-12 PROBLEM — N80.9 ENDOMETRIOSIS: Status: ACTIVE | Noted: 2021-10-22

## 2023-06-12 PROBLEM — N93.9 ABNORMAL UTERINE BLEEDING: Status: ACTIVE | Noted: 2021-09-24

## 2023-06-13 PROBLEM — N94.3 PMS (PREMENSTRUAL SYNDROME): Status: RESOLVED | Noted: 2021-06-29 | Resolved: 2023-06-13

## 2023-06-13 PROBLEM — N93.9 ABNORMAL UTERINE BLEEDING: Status: RESOLVED | Noted: 2021-09-24 | Resolved: 2023-06-13

## 2023-06-13 PROBLEM — N97.8 ENCOUNTER FOR MALE FACTOR INFERTILITY IN FEMALE PATIENT: Status: RESOLVED | Noted: 2022-08-22 | Resolved: 2023-06-13

## 2023-06-13 PROBLEM — Z31.81 ENCOUNTER FOR MALE FACTOR INFERTILITY IN FEMALE PATIENT: Status: RESOLVED | Noted: 2022-08-22 | Resolved: 2023-06-13

## 2023-06-19 ENCOUNTER — OFFICE VISIT (OUTPATIENT)
Dept: SURGERY | Age: 28
End: 2023-06-19

## 2023-06-19 VITALS
DIASTOLIC BLOOD PRESSURE: 62 MMHG | WEIGHT: 155.4 LBS | BODY MASS INDEX: 24.98 KG/M2 | HEIGHT: 66 IN | RESPIRATION RATE: 16 BRPM | HEART RATE: 70 BPM | SYSTOLIC BLOOD PRESSURE: 118 MMHG | OXYGEN SATURATION: 99 % | TEMPERATURE: 97.2 F

## 2023-06-19 DIAGNOSIS — L08.9 INFECTED CYST OF SKIN: Primary | ICD-10-CM

## 2023-06-19 DIAGNOSIS — L72.9 INFECTED CYST OF SKIN: Primary | ICD-10-CM

## 2023-06-19 DIAGNOSIS — N80.9 ENDOMETRIOSIS: ICD-10-CM

## 2023-06-19 RX ORDER — CHLORHEXIDINE GLUCONATE 213 G/1000ML
SOLUTION TOPICAL
COMMUNITY
Start: 2023-06-13 | End: 2023-06-19

## 2023-06-19 NOTE — PROGRESS NOTES
Swapna Muñoz MD   General Surgery  New Patient Evaluation in Office  Pt Name: Raven Herrmann  Date of Birth 1995   Today's Date: 6/19/2023  Medical Record Number: [id number]  Referring Provider: TG Summers*  Primary Care Provider: TG Hooks CNP  Chief Complaint:  Chief Complaint   Patient presents with   [facility] Surgical Consult     NP JAMES Mclaughlin-Right Flank abscess-I&D done 6/13/23/axillary abscess-On clindamycin       ASSESSMENT      1. Infected cyst of skin    2. Endometriosis         PLANS      Patient desires proceed with excision of MRSA abscess right flank. She desires sedation. Schedule patient for excision right cutaneous abscess-MRSA in OR with sedation monitored anesthesia care  Techniques of surgery discussed with patient include scar as well as recurrence of infection. She expressed understanding all questions answered. Preoperative testing per anesthesia guidelines. Pregnancy test day of surgery. Lenora Cooper is a 28y.o. year old female who is presenting today in the office for evaluation of a left flank cyst.   She has a history of MRSA infections in the past.She developed a painful bump on her right flank. She saw her PCP as a new patient. Incision and drainage was performed. She was started on oral Bactrim. She apparently had some cystic lesion or abscess of her right axilla but that has resolved with medical treatment. She has a history of prior MRSA infection. Decolonization treatment was started with Bactroban nasal treatments and chlorhexidine baths. She has indurated but not fluctuant cystic lesion of the skin on the right flank. I&D site has closed. Cultures of wound reviewed heavy growth of MRSA. This was resistant to Bactrim. Therapy was changed to oral clindamycin. She denies any fever or chills. She desires excision of this persistently symptomatic wound despite medical treatment.   She said when it was drained and had green and yellow

## 2023-06-22 ASSESSMENT — ENCOUNTER SYMPTOMS
VOICE CHANGE: 0
WHEEZING: 0
SORE THROAT: 0
COUGH: 0
COLOR CHANGE: 1
NAUSEA: 0
VOMITING: 0
SHORTNESS OF BREATH: 0
ABDOMINAL PAIN: 0
TROUBLE SWALLOWING: 0
BLOOD IN STOOL: 0

## 2023-06-27 ENCOUNTER — ANESTHESIA (OUTPATIENT)
Dept: OPERATING ROOM | Age: 28
End: 2023-06-27
Payer: COMMERCIAL

## 2023-06-27 ENCOUNTER — ANESTHESIA EVENT (OUTPATIENT)
Dept: OPERATING ROOM | Age: 28
End: 2023-06-27
Payer: COMMERCIAL

## 2023-06-27 ENCOUNTER — HOSPITAL ENCOUNTER (OUTPATIENT)
Age: 28
Setting detail: OUTPATIENT SURGERY
Discharge: HOME | End: 2023-06-27
Attending: SURGERY | Admitting: SURGERY
Payer: COMMERCIAL

## 2023-06-27 VITALS
WEIGHT: 153.6 LBS | HEART RATE: 59 BPM | SYSTOLIC BLOOD PRESSURE: 110 MMHG | TEMPERATURE: 97.5 F | HEIGHT: 66 IN | OXYGEN SATURATION: 100 % | BODY MASS INDEX: 24.68 KG/M2 | DIASTOLIC BLOOD PRESSURE: 56 MMHG | RESPIRATION RATE: 16 BRPM

## 2023-06-27 DIAGNOSIS — R19.00 FLANK MASS: ICD-10-CM

## 2023-06-27 LAB — PREGNANCY, URINE: NEGATIVE

## 2023-06-27 PROCEDURE — 3700000000 HC ANESTHESIA ATTENDED CARE: Performed by: SURGERY

## 2023-06-27 PROCEDURE — 12032 INTMD RPR S/A/T/EXT 2.6-7.5: CPT | Performed by: SURGERY

## 2023-06-27 PROCEDURE — 7100000011 HC PHASE II RECOVERY - ADDTL 15 MIN: Performed by: SURGERY

## 2023-06-27 PROCEDURE — 2580000003 HC RX 258: Performed by: SURGERY

## 2023-06-27 PROCEDURE — 7100000010 HC PHASE II RECOVERY - FIRST 15 MIN: Performed by: SURGERY

## 2023-06-27 PROCEDURE — 2500000003 HC RX 250 WO HCPCS: Performed by: REGISTERED NURSE

## 2023-06-27 PROCEDURE — 6360000002 HC RX W HCPCS: Performed by: SURGERY

## 2023-06-27 PROCEDURE — 88304 TISSUE EXAM BY PATHOLOGIST: CPT

## 2023-06-27 PROCEDURE — 3600000002 HC SURGERY LEVEL 2 BASE: Performed by: SURGERY

## 2023-06-27 PROCEDURE — 81025 URINE PREGNANCY TEST: CPT

## 2023-06-27 PROCEDURE — 6360000002 HC RX W HCPCS

## 2023-06-27 PROCEDURE — 3700000001 HC ADD 15 MINUTES (ANESTHESIA): Performed by: SURGERY

## 2023-06-27 PROCEDURE — 2709999900 HC NON-CHARGEABLE SUPPLY: Performed by: SURGERY

## 2023-06-27 PROCEDURE — 3600000012 HC SURGERY LEVEL 2 ADDTL 15MIN: Performed by: SURGERY

## 2023-06-27 PROCEDURE — 6360000002 HC RX W HCPCS: Performed by: REGISTERED NURSE

## 2023-06-27 PROCEDURE — 2500000003 HC RX 250 WO HCPCS: Performed by: SURGERY

## 2023-06-27 PROCEDURE — 11403 EXC TR-EXT B9+MARG 2.1-3CM: CPT | Performed by: SURGERY

## 2023-06-27 RX ORDER — FENTANYL CITRATE 50 UG/ML
INJECTION, SOLUTION INTRAMUSCULAR; INTRAVENOUS PRN
Status: DISCONTINUED | OUTPATIENT
Start: 2023-06-27 | End: 2023-06-27 | Stop reason: SDUPTHER

## 2023-06-27 RX ORDER — SODIUM CHLORIDE 9 MG/ML
INJECTION, SOLUTION INTRAVENOUS CONTINUOUS
Status: DISCONTINUED | OUTPATIENT
Start: 2023-06-27 | End: 2023-06-27 | Stop reason: HOSPADM

## 2023-06-27 RX ORDER — SODIUM CHLORIDE 0.9 % (FLUSH) 0.9 %
5-40 SYRINGE (ML) INJECTION PRN
Status: DISCONTINUED | OUTPATIENT
Start: 2023-06-27 | End: 2023-06-27 | Stop reason: HOSPADM

## 2023-06-27 RX ORDER — ACETAMINOPHEN 325 MG/1
650 TABLET ORAL EVERY 4 HOURS PRN
Status: DISCONTINUED | OUTPATIENT
Start: 2023-06-27 | End: 2023-06-27 | Stop reason: HOSPADM

## 2023-06-27 RX ORDER — DEXAMETHASONE SODIUM PHOSPHATE 10 MG/ML
INJECTION, EMULSION INTRAMUSCULAR; INTRAVENOUS PRN
Status: DISCONTINUED | OUTPATIENT
Start: 2023-06-27 | End: 2023-06-27 | Stop reason: SDUPTHER

## 2023-06-27 RX ORDER — DIPHENHYDRAMINE HYDROCHLORIDE 50 MG/ML
INJECTION INTRAMUSCULAR; INTRAVENOUS
Status: COMPLETED
Start: 2023-06-27 | End: 2023-06-27

## 2023-06-27 RX ORDER — LIDOCAINE HYDROCHLORIDE 20 MG/ML
INJECTION, SOLUTION EPIDURAL; INFILTRATION; INTRACAUDAL; PERINEURAL PRN
Status: DISCONTINUED | OUTPATIENT
Start: 2023-06-27 | End: 2023-06-27 | Stop reason: SDUPTHER

## 2023-06-27 RX ORDER — ONDANSETRON 4 MG/1
4 TABLET, ORALLY DISINTEGRATING ORAL EVERY 8 HOURS PRN
Status: DISCONTINUED | OUTPATIENT
Start: 2023-06-27 | End: 2023-06-27 | Stop reason: HOSPADM

## 2023-06-27 RX ORDER — DIPHENHYDRAMINE HYDROCHLORIDE 50 MG/ML
25 INJECTION INTRAMUSCULAR; INTRAVENOUS ONCE
Status: COMPLETED | OUTPATIENT
Start: 2023-06-27 | End: 2023-06-27

## 2023-06-27 RX ORDER — SODIUM CHLORIDE 0.9 % (FLUSH) 0.9 %
5-40 SYRINGE (ML) INJECTION EVERY 12 HOURS SCHEDULED
Status: DISCONTINUED | OUTPATIENT
Start: 2023-06-27 | End: 2023-06-27 | Stop reason: HOSPADM

## 2023-06-27 RX ORDER — SODIUM CHLORIDE 9 MG/ML
INJECTION, SOLUTION INTRAVENOUS PRN
Status: DISCONTINUED | OUTPATIENT
Start: 2023-06-27 | End: 2023-06-27 | Stop reason: HOSPADM

## 2023-06-27 RX ORDER — ONDANSETRON 2 MG/ML
4 INJECTION INTRAMUSCULAR; INTRAVENOUS EVERY 6 HOURS PRN
Status: DISCONTINUED | OUTPATIENT
Start: 2023-06-27 | End: 2023-06-27 | Stop reason: HOSPADM

## 2023-06-27 RX ORDER — KETOROLAC TROMETHAMINE 30 MG/ML
INJECTION, SOLUTION INTRAMUSCULAR; INTRAVENOUS PRN
Status: DISCONTINUED | OUTPATIENT
Start: 2023-06-27 | End: 2023-06-27 | Stop reason: SDUPTHER

## 2023-06-27 RX ORDER — PROPOFOL 10 MG/ML
INJECTION, EMULSION INTRAVENOUS CONTINUOUS PRN
Status: DISCONTINUED | OUTPATIENT
Start: 2023-06-27 | End: 2023-06-27 | Stop reason: SDUPTHER

## 2023-06-27 RX ORDER — MIDAZOLAM HYDROCHLORIDE 1 MG/ML
INJECTION INTRAMUSCULAR; INTRAVENOUS PRN
Status: DISCONTINUED | OUTPATIENT
Start: 2023-06-27 | End: 2023-06-27 | Stop reason: SDUPTHER

## 2023-06-27 RX ORDER — ENOXAPARIN SODIUM 100 MG/ML
40 INJECTION SUBCUTANEOUS DAILY
Status: DISCONTINUED | OUTPATIENT
Start: 2023-06-27 | End: 2023-06-27 | Stop reason: HOSPADM

## 2023-06-27 RX ORDER — MORPHINE SULFATE 2 MG/ML
4 INJECTION, SOLUTION INTRAMUSCULAR; INTRAVENOUS
Status: DISCONTINUED | OUTPATIENT
Start: 2023-06-27 | End: 2023-06-27 | Stop reason: HOSPADM

## 2023-06-27 RX ORDER — ONDANSETRON 2 MG/ML
INJECTION INTRAMUSCULAR; INTRAVENOUS PRN
Status: DISCONTINUED | OUTPATIENT
Start: 2023-06-27 | End: 2023-06-27 | Stop reason: SDUPTHER

## 2023-06-27 RX ORDER — TRAMADOL HYDROCHLORIDE 50 MG/1
100 TABLET ORAL EVERY 6 HOURS PRN
Status: DISCONTINUED | OUTPATIENT
Start: 2023-06-27 | End: 2023-06-27 | Stop reason: HOSPADM

## 2023-06-27 RX ORDER — MORPHINE SULFATE 2 MG/ML
2 INJECTION, SOLUTION INTRAMUSCULAR; INTRAVENOUS
Status: DISCONTINUED | OUTPATIENT
Start: 2023-06-27 | End: 2023-06-27 | Stop reason: HOSPADM

## 2023-06-27 RX ORDER — TRAMADOL HYDROCHLORIDE 50 MG/1
50 TABLET ORAL EVERY 6 HOURS PRN
Status: DISCONTINUED | OUTPATIENT
Start: 2023-06-27 | End: 2023-06-27 | Stop reason: HOSPADM

## 2023-06-27 RX ORDER — TRAMADOL HYDROCHLORIDE 50 MG/1
50 TABLET ORAL EVERY 4 HOURS PRN
Qty: 12 TABLET | Refills: 0 | Status: SHIPPED | OUTPATIENT
Start: 2023-06-27 | End: 2023-06-30

## 2023-06-27 RX ADMIN — DIPHENHYDRAMINE HYDROCHLORIDE 25 MG: 50 INJECTION, SOLUTION INTRAMUSCULAR; INTRAVENOUS at 08:28

## 2023-06-27 RX ADMIN — KETOROLAC TROMETHAMINE 30 MG: 30 INJECTION, SOLUTION INTRAMUSCULAR; INTRAVENOUS at 07:38

## 2023-06-27 RX ADMIN — LIDOCAINE HYDROCHLORIDE 100 MG: 20 INJECTION, SOLUTION EPIDURAL; INFILTRATION; INTRACAUDAL; PERINEURAL at 07:37

## 2023-06-27 RX ADMIN — DEXAMETHASONE SODIUM PHOSPHATE 10 MG: 10 INJECTION, EMULSION INTRAMUSCULAR; INTRAVENOUS at 07:39

## 2023-06-27 RX ADMIN — FENTANYL CITRATE 50 MCG: 50 INJECTION, SOLUTION INTRAMUSCULAR; INTRAVENOUS at 07:38

## 2023-06-27 RX ADMIN — MIDAZOLAM 2 MG: 1 INJECTION INTRAMUSCULAR; INTRAVENOUS at 07:30

## 2023-06-27 RX ADMIN — DIPHENHYDRAMINE HYDROCHLORIDE 25 MG: 50 INJECTION INTRAMUSCULAR; INTRAVENOUS at 08:28

## 2023-06-27 RX ADMIN — ONDANSETRON 4 MG: 2 INJECTION INTRAMUSCULAR; INTRAVENOUS at 07:38

## 2023-06-27 RX ADMIN — PROPOFOL 100 MCG/KG/MIN: 10 INJECTION, EMULSION INTRAVENOUS at 07:37

## 2023-06-27 RX ADMIN — VANCOMYCIN HYDROCHLORIDE 1000 MG: 1 INJECTION, POWDER, LYOPHILIZED, FOR SOLUTION INTRAVENOUS at 07:28

## 2023-06-27 ASSESSMENT — PAIN - FUNCTIONAL ASSESSMENT: PAIN_FUNCTIONAL_ASSESSMENT: 0-10

## 2023-06-27 ASSESSMENT — PAIN DESCRIPTION - DESCRIPTORS: DESCRIPTORS: ACHING

## 2023-06-27 ASSESSMENT — PAIN DESCRIPTION - LOCATION: LOCATION: BACK

## 2023-06-27 ASSESSMENT — PAIN SCALES - GENERAL: PAINLEVEL_OUTOF10: 2

## 2023-07-10 ENCOUNTER — OFFICE VISIT (OUTPATIENT)
Dept: SURGERY | Age: 28
End: 2023-07-10
Payer: COMMERCIAL

## 2023-07-10 VITALS
BODY MASS INDEX: 20.59 KG/M2 | SYSTOLIC BLOOD PRESSURE: 122 MMHG | TEMPERATURE: 97.7 F | HEIGHT: 72 IN | HEART RATE: 60 BPM | WEIGHT: 152 LBS | OXYGEN SATURATION: 99 % | RESPIRATION RATE: 18 BRPM | DIASTOLIC BLOOD PRESSURE: 68 MMHG

## 2023-07-10 DIAGNOSIS — L72.9 INFECTED CYST OF SKIN: Primary | ICD-10-CM

## 2023-07-10 DIAGNOSIS — L08.9 INFECTED CYST OF SKIN: Primary | ICD-10-CM

## 2023-07-10 PROCEDURE — 99212 OFFICE O/P EST SF 10 MIN: CPT | Performed by: SURGERY

## 2023-09-02 ENCOUNTER — HOSPITAL ENCOUNTER (EMERGENCY)
Age: 28
Discharge: HOME | End: 2023-09-02
Payer: COMMERCIAL

## 2023-09-02 VITALS
DIASTOLIC BLOOD PRESSURE: 65 MMHG | HEART RATE: 80 BPM | RESPIRATION RATE: 18 BRPM | SYSTOLIC BLOOD PRESSURE: 117 MMHG | OXYGEN SATURATION: 100 % | TEMPERATURE: 97.5 F

## 2023-09-02 DIAGNOSIS — J02.9 VIRAL PHARYNGITIS: Primary | ICD-10-CM

## 2023-09-02 LAB
S PYO AG THROAT QL: NEGATIVE
SARS-COV-2 RDRP RESP QL NAA+PROBE: NOT  DETECTED

## 2023-09-02 PROCEDURE — 99213 OFFICE O/P EST LOW 20 MIN: CPT

## 2023-09-02 PROCEDURE — 87651 STREP A DNA AMP PROBE: CPT

## 2023-09-02 PROCEDURE — 87635 SARS-COV-2 COVID-19 AMP PRB: CPT

## 2023-09-02 PROCEDURE — 87070 CULTURE OTHR SPECIMN AEROBIC: CPT

## 2023-09-02 PROCEDURE — 99213 OFFICE O/P EST LOW 20 MIN: CPT | Performed by: EMERGENCY MEDICINE

## 2023-09-02 RX ORDER — PROGESTERONE 200 MG/1
200 CAPSULE ORAL DAILY
COMMUNITY

## 2023-09-02 ASSESSMENT — PAIN - FUNCTIONAL ASSESSMENT
PAIN_FUNCTIONAL_ASSESSMENT: 0-10
PAIN_FUNCTIONAL_ASSESSMENT: ACTIVITIES ARE NOT PREVENTED

## 2023-09-02 ASSESSMENT — PAIN DESCRIPTION - FREQUENCY: FREQUENCY: INTERMITTENT

## 2023-09-02 ASSESSMENT — ENCOUNTER SYMPTOMS
COUGH: 0
SORE THROAT: 1
SHORTNESS OF BREATH: 0

## 2023-09-02 ASSESSMENT — PAIN DESCRIPTION - ONSET: ONSET: GRADUAL

## 2023-09-02 ASSESSMENT — PAIN SCALES - GENERAL: PAINLEVEL_OUTOF10: 5

## 2023-09-02 ASSESSMENT — PAIN DESCRIPTION - PAIN TYPE: TYPE: ACUTE PAIN

## 2023-09-02 ASSESSMENT — PAIN DESCRIPTION - LOCATION: LOCATION: THROAT

## 2023-09-02 NOTE — DISCHARGE INSTRUCTIONS
Tylenol/ibuprofen as needed for fever or pain    Salt water gargles frequently    Rest, drink plenty of fluids    Follow-up primary care provider return here if symptoms do not improve in 3 to 5 days. Sooner if worse.   You may consider mono testing if symptoms do not improve next week

## 2023-09-02 NOTE — ED PROVIDER NOTES
[facility]  Urgent Care Encounter       CHIEF COMPLAINT       Chief Complaint   Patient presents with    Pharyngitis    Generalized Body Aches       Nurses Notes reviewed and I agree except as noted in the HPI. HISTORY OF PRESENT ILLNESS   Autumn Tijerina is a 28 y.o. female who presents for complaints of sore throat, swollen tonsils, generalized body aches and fatigue. Symptoms for 3 days. She has noticed white patches on her tonsils. Currently rates her sore throat 5 on 10 scale. No known fever. HPI    REVIEW OF SYSTEMS     Review of Systems   Constitutional:  Positive for fatigue. Negative for activity change and fever. HENT:  Positive for sore throat. Respiratory:  Negative for cough and shortness of breath. Cardiovascular:  Negative for chest pain. Neurological:  Negative for dizziness. Hematological:  Positive for adenopathy. PAST MEDICAL HISTORY   History reviewed. No pertinent past medical history. SURGICALHISTORY     Patient  has a past surgical history that includes pelvic laparoscopy (02/18/2022) and Abdomen surgery (Left, 6/27/2023). CURRENT MEDICATIONS       Previous Medications    CHOLECALCIFEROL (VITAMIN D3) 50 MCG (2000 UT) CAPS    Take by mouth daily    MUPIROCIN (BACTROBAN) 2 % OINTMENT    Apply to nares twice daily for 10 days. NONFORMULARY    1 tablet OVABOOST    PROGESTERONE (PROMETRIUM) 200 MG CAPS CAPSULE    Take 1 capsule by mouth daily    VITAMIN B-6 (PYRIDOXINE) 100 MG TABLET    Take 1 tablet by mouth daily       ALLERGIES     Patient is is allergic to vancomycin.     Patients   Immunization History   Administered Date(s) Administered    COVID-19, MODERNA Bivalent, (age 12y+), IM, 48 mcg/0.5 mL 11/11/2022    COVID-19, PFIZER PURPLE top, DILUTE for use, (age 15 y+), 30mcg/0.3mL 02/27/2021, 04/10/2021    DTaP 01/10/1995, 03/07/1995, 05/02/1995, 03/19/1996, 12/27/1999    Hepatitis B (Engerix-B) 04/26/1999, 05/24/1999, 12/27/1999    Hib

## 2023-09-03 LAB — BACTERIA THROAT AEROBE CULT: NORMAL

## 2023-09-04 LAB — BACTERIA THROAT AEROBE CULT: NORMAL

## 2023-09-05 ENCOUNTER — OFFICE VISIT (OUTPATIENT)
Dept: FAMILY MEDICINE CLINIC | Age: 28
End: 2023-09-05
Payer: COMMERCIAL

## 2023-09-05 VITALS
SYSTOLIC BLOOD PRESSURE: 122 MMHG | RESPIRATION RATE: 12 BRPM | WEIGHT: 155 LBS | BODY MASS INDEX: 21.02 KG/M2 | HEART RATE: 76 BPM | DIASTOLIC BLOOD PRESSURE: 72 MMHG

## 2023-09-05 DIAGNOSIS — J02.9 ACUTE VIRAL PHARYNGITIS: Primary | ICD-10-CM

## 2023-09-05 PROCEDURE — 99213 OFFICE O/P EST LOW 20 MIN: CPT | Performed by: NURSE PRACTITIONER

## 2023-09-05 ASSESSMENT — ENCOUNTER SYMPTOMS
COUGH: 0
SWOLLEN GLANDS: 1
WHEEZING: 0
SHORTNESS OF BREATH: 0
SORE THROAT: 1

## 2023-09-05 NOTE — PROGRESS NOTES
Kenny Peterson (:  1995) is a 28 y.o. female,Established patient, here for evaluation of the following chief complaint(s):  Pharyngitis (Better, was seen at [facility] )         ASSESSMENT/PLAN:  1. Acute viral pharyngitis  - Acute, improving  - Strep, COVID-19 testing and throat culture are negative  - Given improvements in symptoms, no role for additional testing  - OTC treatments as needed for symptomatic relief      Return if symptoms worsen or fail to improve. Subjective   SUBJECTIVE/OBJECTIVE:  Patient presents for  follow up. Was diagnosed with viral pharyngitis. Rapid strep and Covid-19 testing were negative. Pharyngitis  This is a new problem. The current episode started in the past 7 days. The problem occurs constantly. The problem has been gradually improving. Associated symptoms include fatigue, myalgias, a sore throat and swollen glands. Pertinent negatives include no coughing or fever. Review of Systems   Constitutional:  Positive for fatigue. Negative for fever. HENT:  Positive for sore throat. Respiratory:  Negative for cough, shortness of breath and wheezing. Musculoskeletal:  Positive for myalgias. Objective   Physical Exam  Vitals and nursing note reviewed. Constitutional:       General: She is awake. Appearance: Normal appearance. HENT:      Head: Normocephalic and atraumatic. Right Ear: Hearing and external ear normal.      Left Ear: Hearing and external ear normal.      Nose: Nose normal. No congestion or rhinorrhea. Mouth/Throat: Tonsils: Tonsillar exudate present. 1+ on the right. 1+ on the left. Eyes:      General: Lids are normal.         Right eye: No discharge. Left eye: No discharge. Conjunctiva/sclera: Conjunctivae normal.   Neck:      Trachea: No tracheal deviation. Cardiovascular:      Rate and Rhythm: Normal rate and regular rhythm. Heart sounds: Normal heart sounds. No murmur heard.   Pulmonary:

## 2023-12-23 ENCOUNTER — HOSPITAL ENCOUNTER (OUTPATIENT)
Age: 28
Discharge: HOME | End: 2023-12-23
Payer: COMMERCIAL

## 2023-12-23 LAB
ESTRADIOL LEVEL: 214 PG/ML
PROGEST SERPL-MCNC: 23.94 NG/ML

## 2023-12-23 PROCEDURE — 84144 ASSAY OF PROGESTERONE: CPT

## 2023-12-23 PROCEDURE — 82670 ASSAY OF TOTAL ESTRADIOL: CPT

## 2023-12-23 PROCEDURE — 36415 COLL VENOUS BLD VENIPUNCTURE: CPT

## 2023-12-27 ENCOUNTER — HOSPITAL ENCOUNTER (OUTPATIENT)
Age: 28
Discharge: HOME | End: 2023-12-27
Payer: COMMERCIAL

## 2023-12-27 LAB — HCG INTACT+B SERPL-ACNC: 146.9 MIU/ML (ref 0–5)

## 2023-12-27 PROCEDURE — 84702 CHORIONIC GONADOTROPIN TEST: CPT

## 2023-12-27 PROCEDURE — 36415 COLL VENOUS BLD VENIPUNCTURE: CPT

## 2024-01-02 ENCOUNTER — HOSPITAL ENCOUNTER (OUTPATIENT)
Age: 29
Discharge: HOME | End: 2024-01-02
Payer: COMMERCIAL

## 2024-01-02 LAB — HCG INTACT+B SERPL-ACNC: 2293 MIU/ML (ref 0–5)

## 2024-01-02 PROCEDURE — 84702 CHORIONIC GONADOTROPIN TEST: CPT

## 2024-01-02 PROCEDURE — 36415 COLL VENOUS BLD VENIPUNCTURE: CPT

## 2024-01-12 ENCOUNTER — HOSPITAL ENCOUNTER (OUTPATIENT)
Age: 29
Discharge: HOME | End: 2024-01-12
Payer: COMMERCIAL

## 2024-01-12 LAB — PROGEST SERPL-MCNC: 28.97 NG/ML

## 2024-01-12 PROCEDURE — 36415 COLL VENOUS BLD VENIPUNCTURE: CPT

## 2024-01-12 PROCEDURE — 84144 ASSAY OF PROGESTERONE: CPT

## 2024-01-26 ENCOUNTER — HOSPITAL ENCOUNTER (OUTPATIENT)
Age: 29
Discharge: HOME | End: 2024-01-26
Payer: COMMERCIAL

## 2024-01-26 LAB — PROGEST SERPL-MCNC: 24.76 NG/ML

## 2024-01-26 PROCEDURE — 84144 ASSAY OF PROGESTERONE: CPT

## 2024-01-26 PROCEDURE — 36415 COLL VENOUS BLD VENIPUNCTURE: CPT

## 2024-02-08 ENCOUNTER — NURSE ONLY (OUTPATIENT)
Dept: LAB | Age: 29
End: 2024-02-08

## 2024-02-09 ENCOUNTER — HOSPITAL ENCOUNTER (OUTPATIENT)
Age: 29
Discharge: HOME | End: 2024-02-09
Payer: COMMERCIAL

## 2024-02-09 LAB
PROGEST SERPL-MCNC: 25.75 NG/ML
SOURCE: NORMAL

## 2024-02-09 PROCEDURE — 84144 ASSAY OF PROGESTERONE: CPT

## 2024-02-09 PROCEDURE — 36415 COLL VENOUS BLD VENIPUNCTURE: CPT

## 2024-02-10 LAB — MRSA SPEC QL CULT: NORMAL

## 2024-02-11 LAB
SOURCE: NORMAL
TRICHOMONAS VAGINALI, MOLECULAR: NEGATIVE

## 2024-02-12 LAB
C. TRACHOMATIS DNA,THIN PREP: NEGATIVE
N. GONORRHOEAE DNA, THIN PREP: NEGATIVE
SOURCE: NORMAL

## 2024-02-18 LAB — CYTOLOGY THIN PREP PAP: NORMAL

## 2024-02-23 ENCOUNTER — HOSPITAL ENCOUNTER (OUTPATIENT)
Age: 29
Discharge: HOME | End: 2024-02-23
Payer: COMMERCIAL

## 2024-02-23 LAB — PROGEST SERPL-MCNC: 25.35 NG/ML

## 2024-02-23 PROCEDURE — 36415 COLL VENOUS BLD VENIPUNCTURE: CPT

## 2024-02-23 PROCEDURE — 84144 ASSAY OF PROGESTERONE: CPT

## 2024-03-18 ENCOUNTER — HOSPITAL ENCOUNTER (OUTPATIENT)
Age: 29
Discharge: HOME | End: 2024-03-18
Payer: COMMERCIAL

## 2024-03-18 LAB — PROGEST SERPL-MCNC: 30.85 NG/ML

## 2024-03-18 PROCEDURE — 84144 ASSAY OF PROGESTERONE: CPT

## 2024-03-18 PROCEDURE — 36415 COLL VENOUS BLD VENIPUNCTURE: CPT

## 2024-04-15 ENCOUNTER — HOSPITAL ENCOUNTER (OUTPATIENT)
Age: 29
Discharge: HOME | End: 2024-04-15
Payer: COMMERCIAL

## 2024-04-15 LAB — PROGEST SERPL-MCNC: 43.24 NG/ML

## 2024-04-15 PROCEDURE — 84144 ASSAY OF PROGESTERONE: CPT

## 2024-04-15 PROCEDURE — 36415 COLL VENOUS BLD VENIPUNCTURE: CPT

## 2024-05-13 ENCOUNTER — HOSPITAL ENCOUNTER (OUTPATIENT)
Age: 29
Discharge: HOME | End: 2024-05-13
Payer: COMMERCIAL

## 2024-05-13 LAB — PROGEST SERPL-MCNC: 42.97 NG/ML

## 2024-05-13 PROCEDURE — 84144 ASSAY OF PROGESTERONE: CPT

## 2024-05-13 PROCEDURE — 36415 COLL VENOUS BLD VENIPUNCTURE: CPT

## 2024-05-28 ENCOUNTER — HOSPITAL ENCOUNTER (OUTPATIENT)
Age: 29
Discharge: HOME | End: 2024-05-28
Payer: COMMERCIAL

## 2024-05-28 LAB — PROGEST SERPL-MCNC: 50.3 NG/ML

## 2024-05-28 PROCEDURE — 36415 COLL VENOUS BLD VENIPUNCTURE: CPT

## 2024-05-28 PROCEDURE — 84144 ASSAY OF PROGESTERONE: CPT

## 2024-06-12 ENCOUNTER — HOSPITAL ENCOUNTER (OUTPATIENT)
Dept: NURSING | Age: 29
Discharge: HOME | End: 2024-06-12
Payer: COMMERCIAL

## 2024-06-12 VITALS
SYSTOLIC BLOOD PRESSURE: 124 MMHG | HEART RATE: 75 BPM | TEMPERATURE: 97 F | RESPIRATION RATE: 16 BRPM | DIASTOLIC BLOOD PRESSURE: 58 MMHG | OXYGEN SATURATION: 100 %

## 2024-06-12 LAB
ANTIBODY SCREEN: NORMAL
GA (WEEKS): NORMAL WK

## 2024-06-12 PROCEDURE — 96372 THER/PROPH/DIAG INJ SC/IM: CPT

## 2024-06-12 PROCEDURE — 86850 RBC ANTIBODY SCREEN: CPT

## 2024-06-12 NOTE — DISCHARGE INSTRUCTIONS
ACTIVITY:  Continue usual care with your doctor. Call your doctor immediately if any severe problems or go to the nearest emergency room.      I have been treated and hereby acknowledge receiving this instruction sheet.                  Please take Rhogam injection card to delivery.

## 2024-06-12 NOTE — PROGRESS NOTES
1300- Patient arrived to [facility] ambulatory for Rhogam injection. Patient confirms she is 28 weeks pregnant. Medication explained to patient. Vitals stable. Lab work obtained per order. Call light placed within reach. PT RIGHTS AND RESPONSIBILITIES OFFERED TO PT.    1354- Injection given. Patient tolerated well with no issues.     1400- Discharge instructions reviewed with patient. Rhogam injection card given to patient. Verbalized understanding with no further questions. AVS provided. Discharged ambulatory to [facility] in stable condition.               __M__ Safety:       (Environmental)  [place] to environment  Ensure ID band is correct and in place/ allergy band as needed  Assess for fall risk  Initiate fall precautions as applicable (fall band, side rails, etc.)  Call light within reach  Bed in low position/ wheels locked    __M__ Pain:       Assess pain level and characteristics  Administer analgesics as ordered  Assess effectiveness of pain management and report to MD as needed    __M__ Knowledge Deficit:  Assess baseline knowledge  Provide teaching at level of understanding  Provide teaching via preferred learning method  Evaluate teaching effectiveness    __M__ Hemodynamic/Respiratory Status:       (Pre and Post Procedure Monitoring)  Assess/Monitor vital signs and LOC  Assess Baseline SpO2 prior to any sedation  Obtain weight/height  Assess vital signs/ LOC until patient meets discharge criteria  Monitor procedure site and notify MD of any issues

## 2024-06-24 ENCOUNTER — HOSPITAL ENCOUNTER (OUTPATIENT)
Age: 29
Discharge: HOME | End: 2024-06-24
Payer: COMMERCIAL

## 2024-06-24 LAB — PROGEST SERPL-MCNC: > 60 NG/ML

## 2024-06-24 PROCEDURE — 36415 COLL VENOUS BLD VENIPUNCTURE: CPT

## 2024-06-24 PROCEDURE — 84144 ASSAY OF PROGESTERONE: CPT

## 2024-07-05 LAB — MISC. #1 REFERENCE GROUP TEST: NORMAL

## 2024-07-16 ENCOUNTER — HOSPITAL ENCOUNTER (OUTPATIENT)
Age: 29
Discharge: HOME | End: 2024-07-16
Payer: COMMERCIAL

## 2024-07-17 ENCOUNTER — TELEPHONE (OUTPATIENT)
Dept: FAMILY MEDICINE CLINIC | Age: 29
End: 2024-07-17

## 2024-07-17 DIAGNOSIS — Z23 NEED FOR VACCINATION: Primary | ICD-10-CM

## 2024-07-17 NOTE — TELEPHONE ENCOUNTER
Patient requesting updated Tdap   She was instructed by GYN to have updated in her 3rd trimester of pregnancy  If okay will you please enter order  She did schedule for 7/19/24 @ 8am

## 2024-07-19 ENCOUNTER — NURSE ONLY (OUTPATIENT)
Dept: FAMILY MEDICINE CLINIC | Age: 29
End: 2024-07-19
Payer: COMMERCIAL

## 2024-07-19 DIAGNOSIS — Z23 NEED FOR VACCINATION: Primary | ICD-10-CM

## 2024-07-19 PROCEDURE — 90471 IMMUNIZATION ADMIN: CPT | Performed by: NURSE PRACTITIONER

## 2024-07-19 PROCEDURE — 90715 TDAP VACCINE 7 YRS/> IM: CPT | Performed by: NURSE PRACTITIONER

## 2024-07-19 NOTE — PROGRESS NOTES
After obtaining consent, and per orders of KELLY Sanford, injection given by Natacha Malik MA. Patient instructed to report any adverse reaction to me immediately.    Immunizations Administered       Name Date Dose Route    TDaP, ADACEL (age 10y-64y), BOOSTRIX (age 10y+), IM, 0.5mL 7/19/2024 0.5 mL Intramuscular    Site: Deltoid- Left    Lot: [id number]    NDC: 82328-562-73            Patient tolerated well  VIS given  Vaccine Checklist filled out

## 2024-07-22 ENCOUNTER — HOSPITAL ENCOUNTER (OUTPATIENT)
Age: 29
Discharge: HOME | End: 2024-07-22
Payer: COMMERCIAL

## 2024-07-22 PROCEDURE — 84144 ASSAY OF PROGESTERONE: CPT

## 2024-07-27 LAB — MISC. #1 REFERENCE GROUP TEST: NORMAL

## 2024-07-27 PROCEDURE — S9442 BIRTHING CLASS: HCPCS

## 2024-09-08 ENCOUNTER — ANESTHESIA (OUTPATIENT)
Dept: LABOR AND DELIVERY | Age: 29
End: 2024-09-08
Payer: COMMERCIAL

## 2024-09-08 ENCOUNTER — HOSPITAL ENCOUNTER (INPATIENT)
Age: 29
LOS: 3 days | Discharge: HOME | End: 2024-09-11
Attending: STUDENT IN AN ORGANIZED HEALTH CARE EDUCATION/TRAINING PROGRAM | Admitting: STUDENT IN AN ORGANIZED HEALTH CARE EDUCATION/TRAINING PROGRAM
Payer: COMMERCIAL

## 2024-09-08 ENCOUNTER — ANESTHESIA EVENT (OUTPATIENT)
Dept: LABOR AND DELIVERY | Age: 29
End: 2024-09-08
Payer: COMMERCIAL

## 2024-09-08 PROBLEM — Z34.90 ENCOUNTER FOR INDUCTION OF LABOR: Status: ACTIVE | Noted: 2024-09-08

## 2024-09-08 LAB
ABO: NORMAL
ALBUMIN SERPL BCG-MCNC: 3.4 G/DL (ref 3.5–5.1)
ALP SERPL-CCNC: 131 U/L (ref 38–126)
ALT SERPL W/O P-5'-P-CCNC: 12 U/L (ref 11–66)
AMPHETAMINES UR QL SCN: NEGATIVE
ANION GAP SERPL CALC-SCNC: 13 MEQ/L (ref 8–16)
ANTIBODY SCREEN: NORMAL
AST SERPL-CCNC: 27 U/L (ref 5–40)
BARBITURATES UR QL SCN: NEGATIVE
BASOPHILS ABSOLUTE: 0 THOU/MM3 (ref 0–0.1)
BASOPHILS NFR BLD AUTO: 0.1 %
BENZODIAZ UR QL SCN: NEGATIVE
BILIRUB SERPL-MCNC: 0.3 MG/DL (ref 0.3–1.2)
BUN SERPL-MCNC: 11 MG/DL (ref 7–22)
BZE UR QL SCN: NEGATIVE
CALCIUM SERPL-MCNC: 9.1 MG/DL (ref 8.5–10.5)
CANNABINOIDS UR QL SCN: NEGATIVE
CHLORIDE SERPL-SCNC: 100 MEQ/L (ref 98–111)
CO2 SERPL-SCNC: 22 MEQ/L (ref 23–33)
CREAT SERPL-MCNC: 0.8 MG/DL (ref 0.4–1.2)
CREAT UR-MCNC: 156.3 MG/DL
DEPRECATED RDW RBC AUTO: 41.2 FL (ref 35–45)
EOSINOPHIL NFR BLD AUTO: 0.4 %
EOSINOPHILS ABSOLUTE: 0 THOU/MM3 (ref 0–0.4)
ERYTHROCYTE [DISTWIDTH] IN BLOOD BY AUTOMATED COUNT: 12.1 % (ref 11.5–14.5)
FENTANYL: NEGATIVE
GFR SERPL CREATININE-BSD FRML MDRD: > 90 ML/MIN/1.73M2
GLUCOSE SERPL-MCNC: 96 MG/DL (ref 70–108)
HCT VFR BLD AUTO: 37.5 % (ref 37–47)
HGB BLD-MCNC: 13.1 GM/DL (ref 12–16)
IMM GRANULOCYTES # BLD AUTO: 0.03 THOU/MM3 (ref 0–0.07)
IMM GRANULOCYTES NFR BLD AUTO: 0.3 %
LYMPHOCYTES ABSOLUTE: 1.9 THOU/MM3 (ref 1–4.8)
LYMPHOCYTES NFR BLD AUTO: 18.8 %
MCH RBC QN AUTO: 32.8 PG (ref 26–33)
MCHC RBC AUTO-ENTMCNC: 34.9 GM/DL (ref 32.2–35.5)
MCV RBC AUTO: 93.8 FL (ref 81–99)
MONOCYTES ABSOLUTE: 0.9 THOU/MM3 (ref 0.4–1.3)
MONOCYTES NFR BLD AUTO: 9 %
NEUTROPHILS ABSOLUTE: 7.1 THOU/MM3 (ref 1.8–7.7)
NEUTROPHILS NFR BLD AUTO: 71.4 %
NRBC BLD AUTO-RTO: 0 /100 WBC
OPIATES UR QL SCN: NEGATIVE
OXYCODONE: NEGATIVE
PCP UR QL SCN: NEGATIVE
PLATELET # BLD AUTO: 155 THOU/MM3 (ref 130–400)
PMV BLD AUTO: 10.8 FL (ref 9.4–12.4)
POTASSIUM SERPL-SCNC: 4.1 MEQ/L (ref 3.5–5.2)
PROT SERPL-MCNC: 6.4 G/DL (ref 6.1–8)
PROT UR-MCNC: 20.8 MG/DL
PROT/CREAT 24H UR: 0.13 MG/G{CREAT}
RBC # BLD AUTO: 4 MILL/MM3 (ref 4.2–5.4)
RH FACTOR: NORMAL
SODIUM SERPL-SCNC: 135 MEQ/L (ref 135–145)
WBC # BLD AUTO: 9.9 THOU/MM3 (ref 4.8–10.8)

## 2024-09-08 PROCEDURE — 85025 COMPLETE CBC W/AUTO DIFF WBC: CPT

## 2024-09-08 PROCEDURE — 86900 BLOOD TYPING SEROLOGIC ABO: CPT

## 2024-09-08 PROCEDURE — 86850 RBC ANTIBODY SCREEN: CPT

## 2024-09-08 PROCEDURE — 84156 ASSAY OF PROTEIN URINE: CPT

## 2024-09-08 PROCEDURE — 2500000003 HC RX 250 WO HCPCS: Performed by: ANESTHESIOLOGY

## 2024-09-08 PROCEDURE — 2580000003 HC RX 258: Performed by: STUDENT IN AN ORGANIZED HEALTH CARE EDUCATION/TRAINING PROGRAM

## 2024-09-08 PROCEDURE — 6360000002 HC RX W HCPCS: Performed by: OBSTETRICS & GYNECOLOGY

## 2024-09-08 PROCEDURE — 2500000003 HC RX 250 WO HCPCS

## 2024-09-08 PROCEDURE — 86592 SYPHILIS TEST NON-TREP QUAL: CPT

## 2024-09-08 PROCEDURE — 36415 COLL VENOUS BLD VENIPUNCTURE: CPT

## 2024-09-08 PROCEDURE — 82570 ASSAY OF URINE CREATININE: CPT

## 2024-09-08 PROCEDURE — 86901 BLOOD TYPING SEROLOGIC RH(D): CPT

## 2024-09-08 PROCEDURE — 3700000025 EPIDURAL BLOCK: Performed by: ANESTHESIOLOGY

## 2024-09-08 PROCEDURE — 6360000002 HC RX W HCPCS: Performed by: STUDENT IN AN ORGANIZED HEALTH CARE EDUCATION/TRAINING PROGRAM

## 2024-09-08 PROCEDURE — 80053 COMPREHEN METABOLIC PANEL: CPT

## 2024-09-08 PROCEDURE — 1220000001 HC SEMI PRIVATE L&D R&B

## 2024-09-08 PROCEDURE — 80307 DRUG TEST PRSMV CHEM ANLYZR: CPT

## 2024-09-08 RX ORDER — ACETAMINOPHEN 325 MG/1
650 TABLET ORAL EVERY 4 HOURS PRN
Status: DISCONTINUED | OUTPATIENT
Start: 2024-09-08 | End: 2024-09-09 | Stop reason: HOSPADM

## 2024-09-08 RX ORDER — PROMETHAZINE HYDROCHLORIDE 25 MG/ML
50 INJECTION, SOLUTION INTRAMUSCULAR; INTRAVENOUS
Status: DISPENSED | OUTPATIENT
Start: 2024-09-08 | End: 2024-09-09

## 2024-09-08 RX ORDER — DIPHENHYDRAMINE HCL 25 MG
25 TABLET ORAL EVERY 4 HOURS PRN
Status: DISCONTINUED | OUTPATIENT
Start: 2024-09-08 | End: 2024-09-09 | Stop reason: HOSPADM

## 2024-09-08 RX ORDER — SODIUM CHLORIDE, SODIUM LACTATE, POTASSIUM CHLORIDE, AND CALCIUM CHLORIDE .6; .31; .03; .02 G/100ML; G/100ML; G/100ML; G/100ML
500 INJECTION, SOLUTION INTRAVENOUS PRN
Status: DISCONTINUED | OUTPATIENT
Start: 2024-09-08 | End: 2024-09-09 | Stop reason: HOSPADM

## 2024-09-08 RX ORDER — ONDANSETRON 2 MG/ML
4 INJECTION INTRAMUSCULAR; INTRAVENOUS EVERY 6 HOURS PRN
Status: DISCONTINUED | OUTPATIENT
Start: 2024-09-08 | End: 2024-09-09 | Stop reason: SDUPTHER

## 2024-09-08 RX ORDER — PROMETHAZINE HYDROCHLORIDE 25 MG/ML
25 INJECTION, SOLUTION INTRAMUSCULAR; INTRAVENOUS ONCE
Status: COMPLETED | OUTPATIENT
Start: 2024-09-08 | End: 2024-09-08

## 2024-09-08 RX ORDER — SODIUM CHLORIDE, SODIUM LACTATE, POTASSIUM CHLORIDE, AND CALCIUM CHLORIDE .6; .31; .03; .02 G/100ML; G/100ML; G/100ML; G/100ML
1000 INJECTION, SOLUTION INTRAVENOUS PRN
Status: DISCONTINUED | OUTPATIENT
Start: 2024-09-08 | End: 2024-09-09 | Stop reason: HOSPADM

## 2024-09-08 RX ORDER — SEVOFLURANE 250 ML/250ML
1 LIQUID RESPIRATORY (INHALATION) CONTINUOUS PRN
Status: DISCONTINUED | OUTPATIENT
Start: 2024-09-08 | End: 2024-09-09 | Stop reason: HOSPADM

## 2024-09-08 RX ORDER — ZOLPIDEM TARTRATE 5 MG/1
5 TABLET ORAL NIGHTLY PRN
Status: DISCONTINUED | OUTPATIENT
Start: 2024-09-08 | End: 2024-09-11 | Stop reason: HOSPADM

## 2024-09-08 RX ORDER — BUTORPHANOL TARTRATE 1 MG/ML
1 INJECTION, SOLUTION INTRAMUSCULAR; INTRAVENOUS
Status: DISCONTINUED | OUTPATIENT
Start: 2024-09-08 | End: 2024-09-09 | Stop reason: HOSPADM

## 2024-09-08 RX ORDER — SODIUM CHLORIDE 0.9 % (FLUSH) 0.9 %
5-40 SYRINGE (ML) INJECTION EVERY 12 HOURS SCHEDULED
Status: DISCONTINUED | OUTPATIENT
Start: 2024-09-08 | End: 2024-09-09 | Stop reason: HOSPADM

## 2024-09-08 RX ORDER — HYDRALAZINE HYDROCHLORIDE 20 MG/ML
10 INJECTION INTRAMUSCULAR; INTRAVENOUS
Status: ACTIVE | OUTPATIENT
Start: 2024-09-08 | End: 2024-09-09

## 2024-09-08 RX ORDER — LABETALOL HYDROCHLORIDE 5 MG/ML
40 INJECTION, SOLUTION INTRAVENOUS
Status: DISPENSED | OUTPATIENT
Start: 2024-09-08 | End: 2024-09-09

## 2024-09-08 RX ORDER — HYDRALAZINE HYDROCHLORIDE 20 MG/ML
5 INJECTION INTRAMUSCULAR; INTRAVENOUS
Status: COMPLETED | OUTPATIENT
Start: 2024-09-08 | End: 2024-09-08

## 2024-09-08 RX ORDER — FENTANYL CITRATE 50 UG/ML
100 INJECTION, SOLUTION INTRAMUSCULAR; INTRAVENOUS
Status: DISCONTINUED | OUTPATIENT
Start: 2024-09-08 | End: 2024-09-09 | Stop reason: HOSPADM

## 2024-09-08 RX ORDER — OXYTOCIN/0.9 % SODIUM CHLORIDE 30/500 ML
87.3 PLASTIC BAG, INJECTION (ML) INTRAVENOUS PRN
Status: DISCONTINUED | OUTPATIENT
Start: 2024-09-08 | End: 2024-09-11 | Stop reason: HOSPADM

## 2024-09-08 RX ORDER — ONDANSETRON 4 MG/1
4 TABLET, ORALLY DISINTEGRATING ORAL EVERY 6 HOURS PRN
Status: DISCONTINUED | OUTPATIENT
Start: 2024-09-08 | End: 2024-09-09 | Stop reason: SDUPTHER

## 2024-09-08 RX ORDER — SODIUM CHLORIDE, SODIUM LACTATE, POTASSIUM CHLORIDE, CALCIUM CHLORIDE 600; 310; 30; 20 MG/100ML; MG/100ML; MG/100ML; MG/100ML
INJECTION, SOLUTION INTRAVENOUS CONTINUOUS
Status: DISCONTINUED | OUTPATIENT
Start: 2024-09-08 | End: 2024-09-09 | Stop reason: HOSPADM

## 2024-09-08 RX ORDER — OXYTOCIN/0.9 % SODIUM CHLORIDE 30/500 ML
1-20 PLASTIC BAG, INJECTION (ML) INTRAVENOUS CONTINUOUS
Status: DISCONTINUED | OUTPATIENT
Start: 2024-09-08 | End: 2024-09-09 | Stop reason: HOSPADM

## 2024-09-08 RX ORDER — TERBUTALINE SULFATE 1 MG/ML
0.25 INJECTION, SOLUTION SUBCUTANEOUS
Status: DISCONTINUED | OUTPATIENT
Start: 2024-09-08 | End: 2024-09-09 | Stop reason: HOSPADM

## 2024-09-08 RX ORDER — TRANEXAMIC ACID 10 MG/ML
1000 INJECTION, SOLUTION INTRAVENOUS
Status: DISCONTINUED | OUTPATIENT
Start: 2024-09-08 | End: 2024-09-09 | Stop reason: HOSPADM

## 2024-09-08 RX ORDER — SODIUM CHLORIDE 0.9 % (FLUSH) 0.9 %
5-40 SYRINGE (ML) INJECTION PRN
Status: DISCONTINUED | OUTPATIENT
Start: 2024-09-08 | End: 2024-09-09 | Stop reason: HOSPADM

## 2024-09-08 RX ORDER — ONDANSETRON 2 MG/ML
4 INJECTION INTRAMUSCULAR; INTRAVENOUS EVERY 6 HOURS PRN
Status: DISCONTINUED | OUTPATIENT
Start: 2024-09-08 | End: 2024-09-09 | Stop reason: HOSPADM

## 2024-09-08 RX ORDER — OXYCODONE HYDROCHLORIDE 5 MG/1
5 TABLET ORAL EVERY 4 HOURS PRN
Status: DISCONTINUED | OUTPATIENT
Start: 2024-09-08 | End: 2024-09-09 | Stop reason: HOSPADM

## 2024-09-08 RX ORDER — METHYLERGONOVINE MALEATE 0.2 MG/ML
200 INJECTION INTRAVENOUS PRN
Status: DISCONTINUED | OUTPATIENT
Start: 2024-09-08 | End: 2024-09-09 | Stop reason: HOSPADM

## 2024-09-08 RX ORDER — DIPHENHYDRAMINE HYDROCHLORIDE 50 MG/ML
25 INJECTION INTRAMUSCULAR; INTRAVENOUS EVERY 4 HOURS PRN
Status: DISCONTINUED | OUTPATIENT
Start: 2024-09-08 | End: 2024-09-09 | Stop reason: HOSPADM

## 2024-09-08 RX ORDER — SODIUM CHLORIDE 9 MG/ML
INJECTION, SOLUTION INTRAVENOUS PRN
Status: DISCONTINUED | OUTPATIENT
Start: 2024-09-08 | End: 2024-09-09 | Stop reason: HOSPADM

## 2024-09-08 RX ORDER — HYDRALAZINE HYDROCHLORIDE 20 MG/ML
INJECTION INTRAMUSCULAR; INTRAVENOUS
Status: DISPENSED
Start: 2024-09-08 | End: 2024-09-09

## 2024-09-08 RX ORDER — LABETALOL HYDROCHLORIDE 5 MG/ML
20 INJECTION, SOLUTION INTRAVENOUS
Status: DISPENSED | OUTPATIENT
Start: 2024-09-08 | End: 2024-09-09

## 2024-09-08 RX ORDER — CARBOPROST TROMETHAMINE 250 UG/ML
250 INJECTION, SOLUTION INTRAMUSCULAR PRN
Status: DISCONTINUED | OUTPATIENT
Start: 2024-09-08 | End: 2024-09-09 | Stop reason: HOSPADM

## 2024-09-08 RX ORDER — DOCUSATE SODIUM 100 MG/1
100 CAPSULE, LIQUID FILLED ORAL 2 TIMES DAILY PRN
Status: DISCONTINUED | OUTPATIENT
Start: 2024-09-08 | End: 2024-09-09 | Stop reason: HOSPADM

## 2024-09-08 RX ORDER — NALOXONE HYDROCHLORIDE 0.4 MG/ML
INJECTION, SOLUTION INTRAMUSCULAR; INTRAVENOUS; SUBCUTANEOUS PRN
Status: DISCONTINUED | OUTPATIENT
Start: 2024-09-08 | End: 2024-09-09 | Stop reason: HOSPADM

## 2024-09-08 RX ORDER — MISOPROSTOL 200 UG/1
1000 TABLET ORAL PRN
Status: DISCONTINUED | OUTPATIENT
Start: 2024-09-08 | End: 2024-09-11 | Stop reason: HOSPADM

## 2024-09-08 RX ORDER — EPHEDRINE SULFATE/0.9% NACL/PF 25 MG/5 ML
SYRINGE (ML) INTRAVENOUS
Status: COMPLETED
Start: 2024-09-08 | End: 2024-09-08

## 2024-09-08 RX ORDER — MEPERIDINE HYDROCHLORIDE 50 MG/ML
50 INJECTION INTRAMUSCULAR; INTRAVENOUS; SUBCUTANEOUS
Status: ACTIVE | OUTPATIENT
Start: 2024-09-08 | End: 2024-09-09

## 2024-09-08 RX ORDER — SODIUM CHLORIDE 9 MG/ML
INJECTION, SOLUTION INTRAVENOUS CONTINUOUS
Status: DISCONTINUED | OUTPATIENT
Start: 2024-09-08 | End: 2024-09-11 | Stop reason: HOSPADM

## 2024-09-08 RX ADMIN — HYDRALAZINE HYDROCHLORIDE 5 MG: 20 INJECTION INTRAMUSCULAR; INTRAVENOUS at 22:14

## 2024-09-08 RX ADMIN — PROMETHAZINE HYDROCHLORIDE 25 MG: 25 INJECTION INTRAMUSCULAR; INTRAVENOUS at 21:23

## 2024-09-08 RX ADMIN — SODIUM CHLORIDE, POTASSIUM CHLORIDE, SODIUM LACTATE AND CALCIUM CHLORIDE: 600; 310; 30; 20 INJECTION, SOLUTION INTRAVENOUS at 23:45

## 2024-09-08 RX ADMIN — Medication 18 ML/HR: at 22:42

## 2024-09-08 RX ADMIN — SODIUM CHLORIDE, POTASSIUM CHLORIDE, SODIUM LACTATE AND CALCIUM CHLORIDE: 600; 310; 30; 20 INJECTION, SOLUTION INTRAVENOUS at 22:17

## 2024-09-08 RX ADMIN — EPHEDRINE SULFATE 5 MG: 5 INJECTION INTRAVENOUS at 23:26

## 2024-09-08 RX ADMIN — SODIUM CHLORIDE, POTASSIUM CHLORIDE, SODIUM LACTATE AND CALCIUM CHLORIDE: 600; 310; 30; 20 INJECTION, SOLUTION INTRAVENOUS at 16:36

## 2024-09-08 RX ADMIN — ONDANSETRON 4 MG: 2 INJECTION INTRAMUSCULAR; INTRAVENOUS at 19:59

## 2024-09-09 LAB
FETAL CELL SCN BLD QL ROSETTE: NORMAL
RPR SER QL: NONREACTIVE

## 2024-09-09 PROCEDURE — 85461 HEMOGLOBIN FETAL: CPT

## 2024-09-09 PROCEDURE — 6370000000 HC RX 637 (ALT 250 FOR IP): Performed by: STUDENT IN AN ORGANIZED HEALTH CARE EDUCATION/TRAINING PROGRAM

## 2024-09-09 PROCEDURE — 1200000000 HC SEMI PRIVATE

## 2024-09-09 PROCEDURE — 10907ZC DRAINAGE OF AMNIOTIC FLUID, THERAPEUTIC FROM PRODUCTS OF CONCEPTION, VIA NATURAL OR ARTIFICIAL OPENING: ICD-10-PCS | Performed by: STUDENT IN AN ORGANIZED HEALTH CARE EDUCATION/TRAINING PROGRAM

## 2024-09-09 PROCEDURE — 0KQM0ZZ REPAIR PERINEUM MUSCLE, OPEN APPROACH: ICD-10-PCS | Performed by: STUDENT IN AN ORGANIZED HEALTH CARE EDUCATION/TRAINING PROGRAM

## 2024-09-09 PROCEDURE — 36415 COLL VENOUS BLD VENIPUNCTURE: CPT

## 2024-09-09 PROCEDURE — 2580000003 HC RX 258: Performed by: STUDENT IN AN ORGANIZED HEALTH CARE EDUCATION/TRAINING PROGRAM

## 2024-09-09 PROCEDURE — 7200000001 HC VAGINAL DELIVERY

## 2024-09-09 PROCEDURE — 88307 TISSUE EXAM BY PATHOLOGIST: CPT

## 2024-09-09 PROCEDURE — 87081 CULTURE SCREEN ONLY: CPT

## 2024-09-09 RX ORDER — SODIUM CHLORIDE 0.9 % (FLUSH) 0.9 %
5-40 SYRINGE (ML) INJECTION EVERY 12 HOURS SCHEDULED
Status: DISCONTINUED | OUTPATIENT
Start: 2024-09-09 | End: 2024-09-11 | Stop reason: HOSPADM

## 2024-09-09 RX ORDER — FERROUS SULFATE 325(65) MG
325 TABLET ORAL 2 TIMES DAILY WITH MEALS
Status: DISCONTINUED | OUTPATIENT
Start: 2024-09-09 | End: 2024-09-11 | Stop reason: HOSPADM

## 2024-09-09 RX ORDER — SODIUM CHLORIDE 9 MG/ML
INJECTION, SOLUTION INTRAVENOUS PRN
Status: DISCONTINUED | OUTPATIENT
Start: 2024-09-09 | End: 2024-09-11 | Stop reason: HOSPADM

## 2024-09-09 RX ORDER — ONDANSETRON 2 MG/ML
4 INJECTION INTRAMUSCULAR; INTRAVENOUS EVERY 6 HOURS PRN
Status: DISCONTINUED | OUTPATIENT
Start: 2024-09-09 | End: 2024-09-11 | Stop reason: HOSPADM

## 2024-09-09 RX ORDER — ONDANSETRON 4 MG/1
4 TABLET, ORALLY DISINTEGRATING ORAL EVERY 6 HOURS PRN
Status: DISCONTINUED | OUTPATIENT
Start: 2024-09-09 | End: 2024-09-11 | Stop reason: HOSPADM

## 2024-09-09 RX ORDER — FAMOTIDINE 20 MG/1
20 TABLET, FILM COATED ORAL 2 TIMES DAILY
Status: DISCONTINUED | OUTPATIENT
Start: 2024-09-09 | End: 2024-09-11 | Stop reason: HOSPADM

## 2024-09-09 RX ORDER — MODIFIED LANOLIN
OINTMENT (GRAM) TOPICAL PRN
Status: DISCONTINUED | OUTPATIENT
Start: 2024-09-09 | End: 2024-09-11 | Stop reason: HOSPADM

## 2024-09-09 RX ORDER — MISOPROSTOL 200 UG/1
TABLET ORAL
Status: DISCONTINUED
Start: 2024-09-09 | End: 2024-09-09 | Stop reason: WASHOUT

## 2024-09-09 RX ORDER — SIMETHICONE 80 MG
80 TABLET,CHEWABLE ORAL EVERY 6 HOURS PRN
Status: DISCONTINUED | OUTPATIENT
Start: 2024-09-09 | End: 2024-09-11 | Stop reason: HOSPADM

## 2024-09-09 RX ORDER — IBUPROFEN 800 MG/1
800 TABLET, FILM COATED ORAL EVERY 8 HOURS
Status: DISCONTINUED | OUTPATIENT
Start: 2024-09-09 | End: 2024-09-11 | Stop reason: HOSPADM

## 2024-09-09 RX ORDER — ACETAMINOPHEN 500 MG
1000 TABLET ORAL EVERY 8 HOURS
Status: DISCONTINUED | OUTPATIENT
Start: 2024-09-09 | End: 2024-09-11 | Stop reason: HOSPADM

## 2024-09-09 RX ORDER — SENNA AND DOCUSATE SODIUM 50; 8.6 MG/1; MG/1
1 TABLET, FILM COATED ORAL DAILY
Status: DISCONTINUED | OUTPATIENT
Start: 2024-09-09 | End: 2024-09-11 | Stop reason: HOSPADM

## 2024-09-09 RX ORDER — SODIUM CHLORIDE, SODIUM LACTATE, POTASSIUM CHLORIDE, CALCIUM CHLORIDE 600; 310; 30; 20 MG/100ML; MG/100ML; MG/100ML; MG/100ML
INJECTION, SOLUTION INTRAVENOUS CONTINUOUS
Status: DISCONTINUED | OUTPATIENT
Start: 2024-09-09 | End: 2024-09-11 | Stop reason: HOSPADM

## 2024-09-09 RX ORDER — SODIUM CHLORIDE 0.9 % (FLUSH) 0.9 %
5-40 SYRINGE (ML) INJECTION PRN
Status: DISCONTINUED | OUTPATIENT
Start: 2024-09-09 | End: 2024-09-11 | Stop reason: HOSPADM

## 2024-09-09 RX ADMIN — ACETAMINOPHEN 1000 MG: 500 TABLET ORAL at 18:08

## 2024-09-09 RX ADMIN — ACETAMINOPHEN 650 MG: 325 TABLET ORAL at 04:35

## 2024-09-09 RX ADMIN — IBUPROFEN 800 MG: 800 TABLET, FILM COATED ORAL at 12:40

## 2024-09-09 RX ADMIN — SODIUM CHLORIDE, POTASSIUM CHLORIDE, SODIUM LACTATE AND CALCIUM CHLORIDE: 600; 310; 30; 20 INJECTION, SOLUTION INTRAVENOUS at 01:09

## 2024-09-09 RX ADMIN — Medication: at 04:35

## 2024-09-09 ASSESSMENT — PAIN DESCRIPTION - LOCATION
LOCATION: ABDOMEN;PERINEUM
LOCATION: ABDOMEN

## 2024-09-09 ASSESSMENT — PAIN SCALES - GENERAL
PAINLEVEL_OUTOF10: 2
PAINLEVEL_OUTOF10: 4
PAINLEVEL_OUTOF10: 2
PAINLEVEL_OUTOF10: 0
PAINLEVEL_OUTOF10: 0

## 2024-09-09 ASSESSMENT — PAIN DESCRIPTION - DESCRIPTORS
DESCRIPTORS: CRAMPING;DISCOMFORT
DESCRIPTORS: CRAMPING

## 2024-09-09 ASSESSMENT — PAIN - FUNCTIONAL ASSESSMENT
PAIN_FUNCTIONAL_ASSESSMENT: ACTIVITIES ARE NOT PREVENTED
PAIN_FUNCTIONAL_ASSESSMENT: ACTIVITIES ARE NOT PREVENTED

## 2024-09-09 ASSESSMENT — PAIN DESCRIPTION - ORIENTATION
ORIENTATION: LOWER
ORIENTATION: LOWER

## 2024-09-10 LAB — HGB BLD-MCNC: 10.9 GM/DL (ref 12–16)

## 2024-09-10 PROCEDURE — 96372 THER/PROPH/DIAG INJ SC/IM: CPT

## 2024-09-10 PROCEDURE — 85018 HEMOGLOBIN: CPT

## 2024-09-10 PROCEDURE — 6370000000 HC RX 637 (ALT 250 FOR IP): Performed by: STUDENT IN AN ORGANIZED HEALTH CARE EDUCATION/TRAINING PROGRAM

## 2024-09-10 PROCEDURE — 6360000002 HC RX W HCPCS: Performed by: STUDENT IN AN ORGANIZED HEALTH CARE EDUCATION/TRAINING PROGRAM

## 2024-09-10 PROCEDURE — 36415 COLL VENOUS BLD VENIPUNCTURE: CPT

## 2024-09-10 PROCEDURE — 1200000000 HC SEMI PRIVATE

## 2024-09-10 RX ORDER — IBUPROFEN 800 MG/1
800 TABLET, FILM COATED ORAL EVERY 8 HOURS PRN
COMMUNITY
Start: 2024-09-10

## 2024-09-10 RX ADMIN — IBUPROFEN 800 MG: 800 TABLET, FILM COATED ORAL at 13:48

## 2024-09-10 RX ADMIN — ACETAMINOPHEN 1000 MG: 500 TABLET ORAL at 06:55

## 2024-09-10 RX ADMIN — HUMAN RHO(D) IMMUNE GLOBULIN 300 MCG: 300 INJECTION, SOLUTION INTRAMUSCULAR at 16:34

## 2024-09-10 ASSESSMENT — PAIN SCALES - GENERAL
PAINLEVEL_OUTOF10: 0
PAINLEVEL_OUTOF10: 1
PAINLEVEL_OUTOF10: 1

## 2024-09-10 ASSESSMENT — PAIN DESCRIPTION - ORIENTATION
ORIENTATION: ANTERIOR
ORIENTATION: RIGHT;LEFT

## 2024-09-10 ASSESSMENT — PAIN DESCRIPTION - ONSET: ONSET: ON-GOING

## 2024-09-10 ASSESSMENT — PAIN DESCRIPTION - DESCRIPTORS
DESCRIPTORS: DISCOMFORT
DESCRIPTORS: ACHING

## 2024-09-10 ASSESSMENT — PAIN DESCRIPTION - LOCATION
LOCATION: HEAD
LOCATION: ABDOMEN

## 2024-09-10 ASSESSMENT — PAIN DESCRIPTION - PAIN TYPE: TYPE: ACUTE PAIN

## 2024-09-10 ASSESSMENT — PAIN DESCRIPTION - FREQUENCY: FREQUENCY: INTERMITTENT

## 2024-09-11 VITALS
RESPIRATION RATE: 16 BRPM | HEIGHT: 66 IN | SYSTOLIC BLOOD PRESSURE: 135 MMHG | BODY MASS INDEX: 32.3 KG/M2 | OXYGEN SATURATION: 100 % | DIASTOLIC BLOOD PRESSURE: 81 MMHG | TEMPERATURE: 98.3 F | WEIGHT: 201 LBS | HEART RATE: 77 BPM

## 2024-09-11 LAB
MRSA SPEC QL CULT: NORMAL
MRSA SPEC QL CULT: NORMAL

## 2024-09-11 ASSESSMENT — PAIN SCALES - GENERAL: PAINLEVEL_OUTOF10: 0

## (undated) DEVICE — SKIN AFFIX SURG ADHESIVE 72/CS 0.55ML: Brand: MEDLINE

## (undated) DEVICE — TUBING, SUCTION, 1/4" X 12', STRAIGHT: Brand: MEDLINE

## (undated) DEVICE — GLOVE SURG SZ 7 L12IN FNGR THK94MIL TRNSLUC YEL LTX HYDRGEL

## (undated) DEVICE — SURE SET SINGLE BASIN-LF: Brand: MEDLINE INDUSTRIES, INC.

## (undated) DEVICE — GOWN,SIRUS,NON REINFRCD,LARGE,SET IN SL: Brand: MEDLINE

## (undated) DEVICE — Z INACTIVE USE 2854097 SPONGE GZ W4XL4IN COT 12 PLY TYP VII WVN C FLD DSGN

## (undated) DEVICE — SPONGE LAP W18XL18IN WHT COT 4 PLY FLD STRUNG RADPQ DISP ST 2 PER PACK

## (undated) DEVICE — SHEET, T, LAPAROTOMY, STERILE: Brand: MEDLINE

## (undated) DEVICE — YANKAUER,BULB TIP,W/O VENT,RIGID,STERILE: Brand: MEDLINE

## (undated) DEVICE — MARKER,SKIN,WI/RULER AND LABELS: Brand: MEDLINE

## (undated) DEVICE — Z INACTIVE SYRINGE BLB IRR

## (undated) DEVICE — APPLICATOR MEDICATED 26 CC TINT HI-LITE ORNG STRL CHLORAPREP

## (undated) DEVICE — INTENDED FOR TISSUE SEPARATION, AND OTHER PROCEDURES THAT REQUIRE A SHARP SURGICAL BLADE TO PUNCTURE OR CUT.: Brand: BARD-PARKER ® CARBON RIB-BACK BLADES

## (undated) DEVICE — 1840 FOAM BLOCK NEEDLE COUNTER: Brand: DEVON